# Patient Record
Sex: FEMALE | Race: ASIAN | Employment: PART TIME | URBAN - METROPOLITAN AREA
[De-identification: names, ages, dates, MRNs, and addresses within clinical notes are randomized per-mention and may not be internally consistent; named-entity substitution may affect disease eponyms.]

---

## 2020-10-12 ENCOUNTER — TRANSCRIBE ORDER (OUTPATIENT)
Dept: SCHEDULING | Age: 45
End: 2020-10-12

## 2020-10-12 DIAGNOSIS — Z12.31 VISIT FOR SCREENING MAMMOGRAM: Primary | ICD-10-CM

## 2020-10-27 ENCOUNTER — HOSPITAL ENCOUNTER (OUTPATIENT)
Dept: MAMMOGRAPHY | Age: 45
Discharge: HOME | End: 2020-10-27
Attending: INTERNAL MEDICINE
Payer: COMMERCIAL

## 2020-10-27 DIAGNOSIS — Z12.31 VISIT FOR SCREENING MAMMOGRAM: ICD-10-CM

## 2020-10-27 PROCEDURE — 77067 SCR MAMMO BI INCL CAD: CPT

## 2020-10-27 PROCEDURE — 77063 BREAST TOMOSYNTHESIS BI: CPT

## 2021-06-15 ENCOUNTER — OFFICE VISIT (OUTPATIENT)
Dept: FAMILY MEDICINE CLINIC | Age: 46
End: 2021-06-15
Payer: COMMERCIAL

## 2021-06-15 VITALS
HEART RATE: 55 BPM | HEIGHT: 65 IN | DIASTOLIC BLOOD PRESSURE: 69 MMHG | BODY MASS INDEX: 21.39 KG/M2 | TEMPERATURE: 97.2 F | OXYGEN SATURATION: 100 % | RESPIRATION RATE: 16 BRPM | WEIGHT: 128.4 LBS | SYSTOLIC BLOOD PRESSURE: 114 MMHG

## 2021-06-15 DIAGNOSIS — Z13.29 SCREENING FOR THYROID DISORDER: ICD-10-CM

## 2021-06-15 DIAGNOSIS — Z12.31 ENCOUNTER FOR SCREENING MAMMOGRAM FOR MALIGNANT NEOPLASM OF BREAST: ICD-10-CM

## 2021-06-15 DIAGNOSIS — Z13.220 SCREENING FOR HYPERLIPIDEMIA: ICD-10-CM

## 2021-06-15 DIAGNOSIS — Z00.00 PREVENTATIVE HEALTH CARE: ICD-10-CM

## 2021-06-15 DIAGNOSIS — M79.669 PAIN IN SHIN, UNSPECIFIED LATERALITY: ICD-10-CM

## 2021-06-15 DIAGNOSIS — Z13.1 SCREENING FOR DIABETES MELLITUS: ICD-10-CM

## 2021-06-15 DIAGNOSIS — Z00.00 ANNUAL PHYSICAL EXAM: Primary | ICD-10-CM

## 2021-06-15 PROCEDURE — 99386 PREV VISIT NEW AGE 40-64: CPT | Performed by: NURSE PRACTITIONER

## 2021-06-15 NOTE — PROGRESS NOTES
Melany Landau presents today for   Chief Complaint   Patient presents with    New Patient       Melany Landau preferred language for health care discussion is english/other. Personal Protective Equipment:   Personal Protective Equipment was used including: mask-surgical and hands-gloves. Patient was placed on no precaution(s). Patient was masked. Precautions:   Patient currently on None  Patient currently roomed with door closed    Is someone accompanying this pt? No    Is the patient using any DME equipment during OV? No    Depression Screening:  3 most recent PHQ Screens 4/6/2016   Little interest or pleasure in doing things Not at all   Feeling down, depressed, irritable, or hopeless Not at all   Total Score PHQ 2 0       Learning Assessment:  Learning Assessment 5/24/2016   PRIMARY LEARNER Patient   HIGHEST LEVEL OF EDUCATION - PRIMARY LEARNER  GRADUATED HIGH SCHOOL OR GED   BARRIERS PRIMARY LEARNER Angelicajovon Elizabeths CAREGIVER Yes   CO-LEARNER NAME Ms. Cicero Lesches LEVEL OF EDUCATION GRADUATED HIGH SCHOOL OR GED   PRIMARY LANGUAGE CHINESE (MANDARIN)   PRIMARY LANGUAGE CO-LEARNER ENGLISH    NEED Yes   LEARNER PREFERENCE PRIMARY LISTENING     DEMONSTRATION   LEARNER PREFERENCE CO-LEARNER LISTENING   ANSWERED BY patient   RELATIONSHIP SELF       Abuse Screening:  Abuse Screening Questionnaire 4/6/2016   Do you ever feel afraid of your partner? N   Are you in a relationship with someone who physically or mentally threatens you? N   Is it safe for you to go home? Y       Fall Risk  No flowsheet data found. Pt currently taking Anticoagulant therapy? No    Coordination of Care:  1. Have you been to the ER, urgent care clinic since your last visit? Hospitalized since your last visit? No    2. Have you seen or consulted any other health care providers outside of the [address] [place] [address] since your last visit? Include any pap smears or colon screening.  No

## 2021-06-15 NOTE — PROGRESS NOTES
HISTORY OF PRESENT ILLNESS Romario Bella is a 54 y.o. female seen for physical exam and labs HPI New to clinic. Patient takes no medication. Went to GYN a few months ago for period cramps. But she'd like to have her pap here because it closer. Good appetite, no urinary issues and reg LMP , every 2 months ROS Visit Vitals /69 (BP 1 Location: Left upper arm, BP Patient Position: Sitting, BP Cuff Size: Adult) Pulse (!) 55 Temp 97.2 °F (36.2 °C) (Temporal) Resp 16 Ht 5' 5\" (1.651 m) Wt 128 lb 6.4 oz (58.2 kg) SpO2 100% BMI 21.37 kg/m² Physical Exam 
Past Medical History:  
Diagnosis Date  GERD (gastroesophageal reflux disease)  No known problems There is no problem list on file for this patient. Current Outpatient Medications on File Prior to Visit Medication Sig Dispense Refill  ibuprofen (MOTRIN) 800 mg tablet Take 1 Tab by mouth every eight (8) hours as needed for Pain. 30 Tab 0 No current facility-administered medications on file prior to visit. ASSESSMENT and PLAN 
  ICD-10-CM ICD-9-CM 1. Annual physical exam  Z00.00 V70.0 2. Screening for diabetes mellitus  Z13.1 V77.1 HEMOGLOBIN A1C WITH EAG 3. Screening for hyperlipidemia  Z13.220 V77.91 LIPID PANEL 4. Preventative health care  Z00.00 V70.0 CBC WITH AUTOMATED DIFF  
   METABOLIC PANEL, COMPREHENSIVE URINALYSIS W/ RFLX MICROSCOPIC  
   VITAMIN D, 25 HYDROXY 5. Screening for thyroid disorder  Z13.29 V77.0 T4, FREE  
   TSH 3RD GENERATION 6. Encounter for screening mammogram for malignant neoplasm of breast  Z12.31 V76.12 [facility] 3D GOLDIE W MAMMO BI SCREENING INCL CAD 50% of 30 minutes spent on counseling and managing care.

## 2021-06-15 NOTE — PATIENT INSTRUCTIONS
Mammogram: About This Test  What is it? A mammogram is an X-ray of the breast that is used to screen for breast cancer. This test can find tumors that are too small for you or your doctor to feel. Cancer is most easily treated when it is found at an early stage. Why is this test done? A mammogram is done to:  · Look for breast cancer in women who don't have symptoms. · Find breast cancer in women who have symptoms. Symptoms of breast cancer may include a lump or thickening in the breast, nipple discharge, or dimpling of the skin on one area of the breast.  · Find an area of suspicious breast tissue to remove for an exam under a microscope (biopsy). How do you prepare for the test?  If you've had a mammogram before at another clinic, have the results sent or bring them with you to your appointment. On the day of the mammogram, don't use any deodorant. And don't use perfume, powders, or ointments near or on your breasts. The residue left on your skin by these substances may interfere with the X-rays. How is the test done? · You will need to take off any jewelry that might interfere with the X-ray pictures. · You will need to take off your clothes above the waist.  · You will be given a cloth or paper gown to use during the test.  · You probably will stand during the mammogram.  · One at a time, your breasts will be placed on a flat plate. · Another plate is then pressed firmly against your breast to help flatten out the breast tissue. You may be asked to lift your arm. · For a few seconds while the X-ray picture is being taken, you will need to hold your breath. · At least two pictures are taken of each breast. One is taken from the top and one from the side. How does having a mammogram feel? A mammogram is often uncomfortable but rarely painful.  If you have sensitive or fragile skin or a skin condition, let the technician know before you have your exam. If you have menstrual periods, the procedure is more comfortable when done within 2 weeks after your period has ended. Having your breasts flattened is usually uncomfortable, but it helps the technician get the best images. How long does the test take? · The test will take about 10 to 15 minutes. You may be in the clinic for up to an hour. · You may be asked to wait a few minutes while the images are checked to make sure they don't need to be redone. What happens after the test?  · You will probably be able to go home right away. · You can go back to your usual activities right away. Follow-up care is a key part of your treatment and safety. Be sure to make and go to all appointments, and call your doctor if you are having problems. It's also a good idea to keep a list of the medicines you take. Ask your doctor when you can expect to have your test results. Where can you learn more? Go to http://www.laughlin.com/  Enter Z238 in the search box to learn more about \"Mammogram: About This Test.\"  Current as of: December 17, 2020               Content Version: 12.8  © 6448-3718 [employer]. Care instructions adapted under license by [employer] (which disclaims liability or warranty for this information). If you have questions about a medical condition or this instruction, always ask your healthcare professional. [address] any warranty or liability for your use of this information. Learning About Cervical Cancer Screening  What is a cervical cancer screening test?     The cervix is the lower part of the uterus that opens into the vagina. Cervical cancer screening tests check the cells on the cervix for changes that could lead to cancer. Two tests can be used to screen for cervical cancer. They may be used alone or together. A Pap test.   This test looks for changes in the cells of the cervix. Some of these cell changes could lead to cancer.   A human papillomavirus (HPV) test.   This test looks for the HPV virus. Some high-risk types of HPV can cause cell changes that could lead to cervical cancer. When should you have a screening test?  If you have a cervix, you may need cervical cancer screening. Screening will depend on many things. Ages 24 to 34  Screening options for these ages include:  · A Pap test. If your results are normal, you can wait 3 years to have another test.  · An HPV test beginning at age 22. If your results are negative, you can wait 5 years to have another test.  Ages 27 to 59  Screening options for these ages include:  · A Pap test. If your results are normal, you can wait 3 years to have another test.  · An HPV test. If your results are negative, you can wait 5 years to have another test.  · A Pap test and an HPV test. If your results are normal, you can wait 5 years to be tested again. Ages 72 and older  If you are age 72 or older and you've always had normal screening results, you may not need screening. Talk to your doctor. What do the results of cervical cancer screening mean? Your test results may be normal. Or the results may show minor or serious changes to the cells on your cervix. Minor changes may go away on their own, especially if you are younger than 30. You may have an abnormal test because you have an infection of the vagina or cervix or because you have low estrogen levels after menopause that are causing the cells to change. If you have a high-risk type of human papillomavirus (HPV) or cell changes that could turn into cancer, you may need more tests. Your doctor may suggest that you wait to be retested. Or you may need to have a colposcopy or treatment right away. Your doctor will recommend a follow-up plan based on your results and your age. Follow-up care is a key part of your treatment and safety. Be sure to make and go to all appointments, and call your doctor if you are having problems.  It's also a good idea to know your test results and keep a list of the medicines you take. Where can you learn more? Go to http://www.Melodigram.com/  Enter P919 in the search box to learn more about \"Learning About Cervical Cancer Screening. \"  Current as of: December 17, 2020               Content Version: 12.8  © 4615-2510 [employer]. Care instructions adapted under license by [employer] (which disclaims liability or warranty for this information). If you have questions about a medical condition or this instruction, always ask your healthcare professional. [address] any warranty or liability for your use of this information.

## 2021-06-16 LAB
25(OH)D3+25(OH)D2 SERPL-MCNC: 24.4 NG/ML (ref 30–100)
ALBUMIN SERPL-MCNC: 4.9 G/DL (ref 3.8–4.8)
ALBUMIN/GLOB SERPL: 1.8 {RATIO} (ref 1.2–2.2)
ALP SERPL-CCNC: 65 IU/L (ref 48–121)
ALT SERPL-CCNC: 11 IU/L (ref 0–32)
APPEARANCE UR: ABNORMAL
AST SERPL-CCNC: 17 IU/L (ref 0–40)
BACTERIA #/AREA URNS HPF: ABNORMAL /[HPF]
BASOPHILS # BLD AUTO: 0 X10E3/UL (ref 0–0.2)
BASOPHILS NFR BLD AUTO: 1 %
BILIRUB SERPL-MCNC: 0.4 MG/DL (ref 0–1.2)
BILIRUB UR QL STRIP: NEGATIVE
BUN SERPL-MCNC: 13 MG/DL (ref 6–24)
BUN/CREAT SERPL: 19 (ref 9–23)
CALCIUM SERPL-MCNC: 9.1 MG/DL (ref 8.7–10.2)
CASTS URNS QL MICRO: ABNORMAL /LPF
CHLORIDE SERPL-SCNC: 104 MMOL/L (ref 96–106)
CHOLEST SERPL-MCNC: 236 MG/DL (ref 100–199)
CO2 SERPL-SCNC: 22 MMOL/L (ref 20–29)
COLOR UR: YELLOW
CREAT SERPL-MCNC: 0.7 MG/DL (ref 0.57–1)
EOSINOPHIL # BLD AUTO: 0.1 X10E3/UL (ref 0–0.4)
EOSINOPHIL NFR BLD AUTO: 2 %
EPI CELLS #/AREA URNS HPF: >10 /HPF (ref 0–10)
ERYTHROCYTE [DISTWIDTH] IN BLOOD BY AUTOMATED COUNT: 13.1 % (ref 11.7–15.4)
EST. AVERAGE GLUCOSE BLD GHB EST-MCNC: 108 MG/DL
GLOBULIN SER CALC-MCNC: 2.7 G/DL (ref 1.5–4.5)
GLUCOSE SERPL-MCNC: 92 MG/DL (ref 65–99)
GLUCOSE UR QL: NEGATIVE
HBA1C MFR BLD: 5.4 % (ref 4.8–5.6)
HCT VFR BLD AUTO: 41.1 % (ref 34–46.6)
HDLC SERPL-MCNC: 79 MG/DL
HGB BLD-MCNC: 13.7 G/DL (ref 11.1–15.9)
HGB UR QL STRIP: NEGATIVE
IMM GRANULOCYTES # BLD AUTO: 0 X10E3/UL (ref 0–0.1)
IMM GRANULOCYTES NFR BLD AUTO: 0 %
IMP & REVIEW OF LAB RESULTS: NORMAL
KETONES UR QL STRIP: NEGATIVE
LDLC SERPL CALC-MCNC: 144 MG/DL (ref 0–99)
LEUKOCYTE ESTERASE UR QL STRIP: ABNORMAL
LYMPHOCYTES # BLD AUTO: 1.2 X10E3/UL (ref 0.7–3.1)
LYMPHOCYTES NFR BLD AUTO: 32 %
MCH RBC QN AUTO: 29.7 PG (ref 26.6–33)
MCHC RBC AUTO-ENTMCNC: 33.3 G/DL (ref 31.5–35.7)
MCV RBC AUTO: 89 FL (ref 79–97)
MICRO URNS: ABNORMAL
MONOCYTES # BLD AUTO: 0.4 X10E3/UL (ref 0.1–0.9)
MONOCYTES NFR BLD AUTO: 10 %
NEUTROPHILS # BLD AUTO: 2.1 X10E3/UL (ref 1.4–7)
NEUTROPHILS NFR BLD AUTO: 55 %
NITRITE UR QL STRIP: NEGATIVE
PH UR STRIP: 5 [PH] (ref 5–7.5)
PLATELET # BLD AUTO: 225 X10E3/UL (ref 150–450)
POTASSIUM SERPL-SCNC: 4.6 MMOL/L (ref 3.5–5.2)
PROT SERPL-MCNC: 7.6 G/DL (ref 6–8.5)
PROT UR QL STRIP: ABNORMAL
RBC # BLD AUTO: 4.62 X10E6/UL (ref 3.77–5.28)
RBC #/AREA URNS HPF: ABNORMAL /HPF (ref 0–2)
SODIUM SERPL-SCNC: 140 MMOL/L (ref 134–144)
SP GR UR: 1.03 (ref 1–1.03)
T4 FREE SERPL-MCNC: 1.25 NG/DL (ref 0.82–1.77)
TRIGL SERPL-MCNC: 76 MG/DL (ref 0–149)
TSH SERPL DL<=0.005 MIU/L-ACNC: 2.38 UIU/ML (ref 0.45–4.5)
UROBILINOGEN UR STRIP-MCNC: 0.2 MG/DL (ref 0.2–1)
VLDLC SERPL CALC-MCNC: 13 MG/DL (ref 5–40)
WBC # BLD AUTO: 3.7 X10E3/UL (ref 3.4–10.8)
WBC #/AREA URNS HPF: ABNORMAL /HPF (ref 0–5)

## 2021-06-20 NOTE — PROGRESS NOTES
Patient CBC was normal. CMP unremarkable. Patient's urine was cloudy with leukocyte esterase and 3-10 red blood cells and a few bacteria. Patient had no complaints of UTI. Will confirm with patient. Total cholesterol high at 236, triglycerides 76,  and HDL heart protective at 79. Patient's HDL is very good at 79 and cancels out some of the high cholesterol and LDL. However advised patient to make an effort to eat less animal fat and processed food and eat more fruits and vegetables and soluble fiber type foods. Hemoglobin A1c 5.4, this is good patient is not prediabetic. Patient's A1c 5 years ago was 6%. Good job. TSH and T4 normal. Vitamin D low at 24.4. I will order patient vitamin D2 50,000 units weekly for 8 weeks.

## 2021-06-20 NOTE — PROGRESS NOTES
OFFICE NOTE    Marylee Barefoot is a 54 y.o. female presenting today for office visit for physical exam and labs. 6/15/2021  9:15 AM      Chief Complaint   Patient presents with    New Patient     HPI:   Patient is new to clinic. Patient speaks Mandarin [place] and some [place]. Her 16year-old son is accompanied her to visit to help translate. Patient says she is well and she looks well. Patient and  own a [address] at [email or web address]. She says her  does most of the cooking and she works at front counter and prepares food. Patient reports that she runs daily. At times she has some shin pain bilaterally. Patient says she has not bought new running shoes in 2 years. Advised patient to stretch her legs before running and purchase new running shoes. Patient reports a few months ago she went to a GYN provider with \"period cramps. \" Patient reports her periods are not as regular as they used to be and she gets them about every 2 months. Patient reports she is not pregnant and she is on no birth control. She says they advised patient to use Motrin for cramps. Patient advised that she would like to have her Pap smears done here at our clinic because it is closer. Patient does not smoke cigarettes, use alcohol or illicit drugs. She is sexually active with her . Patient reports appetite is good, no urinary issues, sleeps well and regular bowel movements. Patient denies fever, chills, chest pain, shortness of breath, abdomen pain or dark tarry stool. Patient is due for breast cancer screening, ordered mammogram.  Advised patient she will be due for colonoscopy or or fecal immunoassay for occult blood.     ROS    · General: negative for - chills, fever, weight changes or malaise  · HEENT: no sore throat, nasal congestion, vision problems or ear problems  · Respiratory: no cough, shortness of breath, or wheezing  · Cardiovascular: no chest pain, palpitations, or dyspnea on exertion  · Gastrointestinal: no abdominal pain, N/V, change in bowel habits, or black or bloody stools  · Musculoskeletal: Positive for bilateral shin pain; no back pain, joint pain, joint stiffness, muscle pain or muscle weakness  · Neurological: no numbness, tingling, headache or dizziness  · Endo:  No polyuria or polydipsia. · : no hematuria, dysuria, frequency, hesitancy, or nocturia. · Psychological: negative for - anxiety, depression, sleep disturbances, suicidal or homicidal ideations    PHQ Screening   3 most recent PHQ Screens 6/15/2021   Little interest or pleasure in doing things Not at all   Feeling down, depressed, irritable, or hopeless Not at all   Total Score PHQ 2 0     History  Past Medical History:   Diagnosis Date    GERD (gastroesophageal reflux disease)     No known problems        No past surgical history on file. Social History     Socioeconomic History    Marital status:      Spouse name: Not on file    Number of children: Not on file    Years of education: Not on file    Highest education level: Not on file   Occupational History    Not on file   Tobacco Use    Smoking status: Never Smoker    Smokeless tobacco: Never Used   Substance and Sexual Activity    Alcohol use: No     Alcohol/week: 0.0 standard drinks    Drug use: No    Sexual activity: Yes     Partners: Male   Other Topics Concern    Not on file   Social History Narrative    Born in [place], moved to [address] [place] [address] in 2006. She speaks a little English         Works at [employer] 29 Strain:     Difficulty of Paying Living Expenses:    Food Insecurity:     Worried About [address] in the Last Year:     [address] in the Last Year:    Transportation Needs:     Lack of Transportation (Medical):      Lack of Transportation (Non-Medical):    Physical Activity:     Days of Exercise per Week:     Minutes of Exercise per Session:    Stress:  Feeling of Stress :    Social Connections:     Frequency of Communication with Friends and Family:     Frequency of Social Gatherings with Friends and Family:     Attends Bahai Services:     Active Member of Clubs or Organizations:     Attends Club or Organization Meetings:     Marital Status:    Intimate Partner Violence:     Fear of Current or Ex-Partner:     Emotionally Abused:     Physically Abused:     Sexually Abused:        No Known Allergies    Current Outpatient Medications   Medication Sig Dispense Refill    ibuprofen (MOTRIN) 800 mg tablet Take 1 Tab by mouth every eight (8) hours as needed for Pain. 30 Tab 0     Patient Care Team:  Patient Care Team:  Isaiah Hernandez NP as PCP - General (Nurse Practitioner)  Isaiah Hernandez NP as PCP - [facility] Empaneled Provider    LABS 6/15/2021:  Patient CBC was normal. CMP unremarkable. Patient's urine was cloudy with leukocyte esterase and 3-10 red blood cells and a few bacteria. Patient had no complaints of UTI. Will confirm with patient. Total cholesterol high at 236, triglycerides 76,  and HDL heart protective at 79. Patient's HDL is very good at 79 and cancels out some of the high cholesterol and LDL. However advised patient to make an effort to eat less animal fat and processed food and eat more fruits and vegetables and soluble fiber type foods. Hemoglobin A1c 5.4, this is good patient is not prediabetic. Patient's A1c 5 years ago was 6%. Good job. TSH and T4 normal. Vitamin D low at 24.4. I will order patient vitamin D2 50,000 units weekly for 8 weeks. RADIOLOGY:  None new to review    Physical Exam    The patient appears well, she is pleasant, alert, oriented x 3, in no distress. ENT normal.  Neck supple. No adenopathy or thyromegaly. TIKI. Lungs are clear, good air entry, no wheezes, rhonchi or rales. S1 and S2 normal, no murmurs, regular rate and rhythm. No LAD.   Chest wall negative for tenderness, breasts appear symmetrical  Abdomen is soft without tenderness, guarding, mass or organomegaly. /Anorectal normal. No vaginal discharge. Muscleskeletal, no swelling, no deformity no tenderness or injury. Extremities show no edema, normal peripheral pulses. Neurological is normal without focal findings. Skin: no concerning lesions on full body survey. Psych: normal affect. Mood good. Oriented x 3. Judgement and insight intact. Vitals:    06/15/21 0903   BP: 114/69   Pulse: (!) 55   Resp: 16   Temp: 97.2 °F (36.2 °C)   TempSrc: Temporal   SpO2: 100%   Weight: 128 lb 6.4 oz (58.2 kg)   Height: 5' 5\" (1.651 m)   PainSc:   0 - No pain     Assessment and Plan  Diagnoses and all orders for this visit:    1. Annual physical exam    2. Screening for diabetes mellitus  -     HEMOGLOBIN A1C WITH EAG; Future    3. Screening for hyperlipidemia  -     LIPID PANEL; Future    4. Preventative health care  -     CBC WITH AUTOMATED DIFF; Future  -     METABOLIC PANEL, COMPREHENSIVE; Future  -     URINALYSIS W/ RFLX MICROSCOPIC; Future  -     VITAMIN D, 25 HYDROXY; Future    5. Screening for thyroid disorder  -     T4, FREE; Future  -     TSH 3RD GENERATION; Future    6. Encounter for screening mammogram for malignant neoplasm of breast  -     [facility] 3D GOLDIE W MAMMO BI SCREENING INCL CAD; Future    7. Pain in shin, unspecified laterality    Other orders  -     CBC WITH AUTOMATED DIFF  -     METABOLIC PANEL, COMPREHENSIVE  -     URINALYSIS W/ RFLX MICROSCOPIC  -     MICROSCOPIC EXAMINATION  -     LIPID PANEL  -     HEMOGLOBIN A1C WITH EAG  -     TSH 3RD GENERATION  -     VITAMIN D, 25 HYDROXY  -     T4, FREE  -     CVD REPORT    50% of 30 minutes spent on counseling and managing time. Patient will schedule a Pap smear with our office. *Plan of care reviewed with patient. Patient in agreement with plan and expresses understanding.  All questions answered and patient encouraged to call or RTO if further questions or concerns. Follow-up and Dispositions    · Return in about 2 months (around 8/15/2021).    Routing History

## 2021-11-01 ENCOUNTER — HOSPITAL ENCOUNTER (OUTPATIENT)
Dept: WOMENS IMAGING | Age: 46
Discharge: HOME | End: 2021-11-01
Attending: NURSE PRACTITIONER
Payer: COMMERCIAL

## 2021-11-01 DIAGNOSIS — Z12.31 ENCOUNTER FOR SCREENING MAMMOGRAM FOR MALIGNANT NEOPLASM OF BREAST: ICD-10-CM

## 2021-11-01 PROCEDURE — 77063 BREAST TOMOSYNTHESIS BI: CPT

## 2022-05-31 ENCOUNTER — OFFICE VISIT (OUTPATIENT)
Dept: FAMILY MEDICINE CLINIC | Age: 47
End: 2022-05-31
Payer: COMMERCIAL

## 2022-05-31 VITALS
HEART RATE: 54 BPM | RESPIRATION RATE: 16 BRPM | SYSTOLIC BLOOD PRESSURE: 110 MMHG | TEMPERATURE: 98.3 F | HEIGHT: 65 IN | OXYGEN SATURATION: 100 % | BODY MASS INDEX: 20.83 KG/M2 | DIASTOLIC BLOOD PRESSURE: 73 MMHG | WEIGHT: 125 LBS

## 2022-05-31 DIAGNOSIS — Z13.0 SCREENING FOR DEFICIENCY ANEMIA: ICD-10-CM

## 2022-05-31 DIAGNOSIS — Z12.11 SCREEN FOR COLON CANCER: ICD-10-CM

## 2022-05-31 DIAGNOSIS — E78.00 ELEVATED LDL CHOLESTEROL LEVEL: ICD-10-CM

## 2022-05-31 DIAGNOSIS — R73.03 PREDIABETES: ICD-10-CM

## 2022-05-31 DIAGNOSIS — Z12.31 ENCOUNTER FOR SCREENING MAMMOGRAM FOR MALIGNANT NEOPLASM OF BREAST: ICD-10-CM

## 2022-05-31 DIAGNOSIS — Z00.00 PREVENTATIVE HEALTH CARE: ICD-10-CM

## 2022-05-31 DIAGNOSIS — Z76.89 SLEEP CONCERN: ICD-10-CM

## 2022-05-31 DIAGNOSIS — Z00.00 ANNUAL PHYSICAL EXAM: Primary | ICD-10-CM

## 2022-05-31 DIAGNOSIS — Z13.29 SCREENING FOR THYROID DISORDER: ICD-10-CM

## 2022-05-31 PROCEDURE — 99396 PREV VISIT EST AGE 40-64: CPT | Performed by: NURSE PRACTITIONER

## 2022-05-31 RX ORDER — ATORVASTATIN CALCIUM 10 MG/1
10 TABLET, FILM COATED ORAL DAILY
COMMUNITY

## 2022-05-31 NOTE — PROGRESS NOTES
Jessica Das presents today for CPE       Is someone accompanying this pt? Son, Stanford Worthy    Is the patient using any DME equipment during OV? no    1. Have you been to the ER, urgent care clinic since your last visit? Hospitalized since your last visit? no    2. Have you seen or consulted any other health care providers outside of the [address] [place] [address] since your last visit? no     3. For patients aged 39-70: Has the patient had a colonoscopy / FIT/ Cologuard? due      If the patient is female:    4. For patients aged 41-77: Has the patient had a mammogram within the past 2 years? Not due      5.  For patients aged 21-65: Has the patient had a pap smear? due

## 2022-05-31 NOTE — PROGRESS NOTES
OFFICE NOTE    Melecio Noland is a 51 y.o. female presenting today for office visit. 5/31/2022  7:09 AM    Chief Complaint   Patient presents with    Complete Physical       HPI:   In office visit related to physical exam.  Patient says she us well and she appears well. She is here with her  and her son is translating. Per son, patient went to urgent care last Saturday for swallowing problems. It appears she was given amoxi and steroids. She has a sore throat and her \"voice was bad. \" those symptoms has resolved. She says she isn't sleeping well, she has broken sleep. We talked about sleep hygiene and stretching before bedtime. Patients last menstrual period was 5/30/2022     Patient reports appetite is good, no urinary issues, sleeps well and regular bowel movements. Patient denies fever, chills, chest pain, shortness of breath, abdomen pain or dark tarry stool. ROS:    · General: negative for - chills, fever, weight changes or malaise  · HEENT: no sore throat, nasal congestion, vision problems or ear problems  · Respiratory: no cough, shortness of breath, or wheezing  · Cardiovascular: no chest pain, palpitations, or dyspnea on exertion  · Gastrointestinal: no abdominal pain, N/V, change in bowel habits, or black or bloody stools  · Musculoskeletal: no back pain, joint pain, joint stiffness, muscle pain or muscle weakness  · Neurological: no numbness, tingling, headache or dizziness  · Endo:  No polyuria or polydipsia. · : no hematuria, dysuria, frequency, hesitancy, or nocturia.     · Skin: No itching or rash, no open skin, no unusual lesions   · Psychological: negative for - anxiety, depression, sleep disturbances, suicidal or homicidal ideations     PHQ Screening   3 most recent PHQ Screens 5/31/2022   Little interest or pleasure in doing things Not at all   Feeling down, depressed, irritable, or hopeless Not at all   Total Score PHQ 2 0     History  Past Medical History:   Diagnosis Date    GERD (gastroesophageal reflux disease)     No known problems        No past surgical history on file. Social History     Socioeconomic History    Marital status:      Spouse name: Not on file    Number of children: Not on file    Years of education: Not on file    Highest education level: Not on file   Occupational History    Not on file   Tobacco Use    Smoking status: Never Smoker    Smokeless tobacco: Never Used   Substance and Sexual Activity    Alcohol use: No     Alcohol/week: 0.0 standard drinks    Drug use: No    Sexual activity: Yes     Partners: Male   Other Topics Concern    Not on file   Social History Narrative    Born in [place], moved to [facility] in 2006. She speaks a little English         Works at [employer] Strain:     Difficulty of Paying Living Expenses: Not on file   Food Insecurity:     Worried About [address] [employer] in the Last Year: Not on file    [address] [employer] in the Last Year: Not on file   Transportation Needs:     Lack of Transportation (Medical): Not on file    Lack of Transportation (Non-Medical):  Not on file   Physical Activity:     Days of Exercise per Week: Not on file    Minutes of Exercise per Session: Not on file   Stress:     Feeling of Stress : Not on file   Social Connections:     Frequency of Communication with Friends and Family: Not on file    Frequency of Social Gatherings with Friends and Family: Not on file    Attends Islam Services: Not on file    Active Member of Clubs or Organizations: Not on file    Attends Club or Organization Meetings: Not on file    Marital Status: Not on file   Intimate Partner Violence:     Fear of Current or Ex-Partner: Not on file    Emotionally Abused: Not on file    Physically Abused: Not on file    Sexually Abused: Not on file   Housing Stability:     Unable to Pay for Housing in the Last Year: Not on file    Number of Places Lived in the Last Year: Not on file    Unstable Housing in the Last Year: Not on file       No Known Allergies    Current Outpatient Medications   Medication Sig Dispense Refill    atorvastatin (LIPITOR) 10 mg tablet Take 10 mg by mouth daily.  ibuprofen (MOTRIN) 800 mg tablet Take 1 Tab by mouth every eight (8) hours as needed for Pain. 30 Tab 0     Patient Care Team:  Patient Care Team:  William Ayala NP as PCP - General (Nurse Practitioner)  William Ayala NP as PCP - [facility] Provider    LABS 6/20/2021:  Patient CBC was normal. CMP unremarkable. Patient's urine was cloudy with leukocyte esterase and 3-10 red blood cells and a few bacteria. Patient had no complaints of UTI. Will confirm with patient.      Total cholesterol high at 236, triglycerides 76,  and HDL heart protective at 79. Patient's HDL is very good at 79 and cancels out some of the high cholesterol and LDL. However advised patient to make an effort to eat less animal fat and processed food and eat more fruits and vegetables and soluble fiber type foods. Hemoglobin A1c 5.4, this is good patient is not prediabetic. Patient's A1c 5 years ago was 6%. Good job. TSH and T4 normal. Vitamin D low at 24.4. I will order patient vitamin D2 50,000 units weekly for 8 weeks.         Physical Exam  Patient appears well, she is pleasant, alert, oriented x 3, in no distress. ENT normal.  Neck supple. No adenopathy or thyromegaly. TIKI. Lungs are clear, good air entry, no wheezes, rhonchi or rales. Cardiovascular, S1 and S2 normal, no murmurs, regular rate and rhythm. No LAD. Chest wall negative for tenderness  Abdomen is soft without tenderness  /Anorectal, deferred. Muscleskeletal, no swelling, no tenderness, no injury. Extremities show no edema  Neurological is normal without focal findings. Skin: no concerning lesions. Psych: normal affect. Mood good. Oriented x 3. Judgement and insight intact.       Vitals: 05/31/22 0815   BP: 110/73   Pulse: (!) 54   Resp: 16   Temp: 98.3 °F (36.8 °C)   TempSrc: Temporal   SpO2: 100%   Weight: 125 lb (56.7 kg)   Height: 5' 5\" (1.651 m)   PainSc:   0 - No pain   LMP: 05/30/2022       Assessment and Plan    Diagnoses and all orders for this visit:    Annual physical exam  -     LIPID PANEL; Future  -     HEMOGLOBIN A1C WITH EAG; Future  -     CBC WITH AUTOMATED DIFF; Future  -     METABOLIC PANEL, COMPREHENSIVE; Future  -     T4, FREE; Future  -     TSH 3RD GENERATION; Future  -     URINALYSIS W/ RFLX MICROSCOPIC; Future    Prediabetes  -     HEMOGLOBIN A1C WITH EAG; Future    Screening for deficiency anemia  -     CBC WITH AUTOMATED DIFF; Future    Preventative health care  -     T4, FREE; Future  -     URINALYSIS W/ RFLX MICROSCOPIC; Future    Elevated LDL cholesterol level  -     LIPID PANEL; Future    Screening for thyroid disorder  -     TSH 3RD GENERATION; Future    Sleep concern  -     VITAMIN D, 25 HYDROXY; Future    Encounter for screening mammogram for malignant neoplasm of breast  -     [facility] 3D GOLDIE W MAMMO BI SCREENING INCL CAD; Future    Screen for colon cancer  -     [facility] 3D GOLDIE W MAMMO BI SCREENING INCL CAD; Future  -     REFERRAL TO GASTROENTEROLOGY         *Plan of care reviewed with patient. Patient in agreement with plan and expresses understanding. All questions answered and patient encouraged to call or RTO if further questions or concerns. 50% of 39 minutes spent on counseling and managing her care. Follow-up and Dispositions    · Return in about 1 month (around 6/30/2022) for 1-2 months for pap smear and lab review .        JORGE Quintana

## 2022-05-31 NOTE — PATIENT INSTRUCTIONS
Learning About Sleeping Well  What does sleeping well mean? Sleeping well means getting enough sleep to feel good and stay healthy. How much sleep is enough varies among people. The number of hours you sleep and how you feel when you wake up are both important. If you do not feel refreshed, you probably need more sleep. Another sign of not getting enough sleep is feeling tired during the day. Experts recommend that adults get at least 7 or more hours of sleep per day. Children and older adults need more sleep. Why is getting enough sleep important? Getting enough quality sleep is a basic part of good health. When your sleep suffers, your physical health, mood, and your thoughts can suffer too. You may find yourself feeling more grumpy or stressed. Not getting enough sleep also can lead to serious problems, including injury, accidents, anxiety, and depression. What might cause poor sleeping? Many things can cause sleep problems, including:  · Changes to your sleep schedule. · Stress. Stress can be caused by fear about a single event, such as giving a speech. Or you may have ongoing stress, such as worry about work or school. · Depression, anxiety, and other mental or emotional conditions. · Changes in your sleep habits or surroundings. This includes changes that happen where you sleep, such as noise, light, or sleeping in a different bed. It also includes changes in your sleep pattern, such as having jet lag or working a late shift. · Health problems, such as pain, breathing problems, and restless legs syndrome. · Lack of regular exercise. · Using alcohol, nicotine, or caffeine before bed. How can you help yourself? Here are some tips that may help you sleep more soundly and wake up feeling more refreshed. Your sleeping area   · Use your bedroom only for sleeping and sex. A bit of light reading may help you fall asleep. But if it doesn't, do your reading elsewhere in the house.  Try not to use your TV, computer, smartphone, or tablet while you are in bed. · Be sure your bed is big enough to stretch out comfortably, especially if you have a sleep partner. · Keep your bedroom quiet, dark, and cool. Use curtains, blinds, or a sleep mask to block out light. To block out noise, use earplugs, soothing music, or a \"white noise\" machine. Your evening and bedtime routine   · Create a relaxing bedtime routine. You might want to take a warm shower or bath, or listen to soothing music. · Go to bed at the same time every night. And get up at the same time every morning, even if you feel tired. What to avoid   · Limit caffeine (coffee, tea, caffeinated sodas) during the day, and don't have any for at least 6 hours before bedtime. · Avoid drinking alcohol before bedtime. Alcohol can cause you to wake up more often during the night. · Try not to smoke or use tobacco, especially in the evening. Nicotine can keep you awake. · Limit naps during the day, especially close to bedtime. · Avoid lying in bed awake for too long. If you can't fall asleep or if you wake up in the middle of the night and can't get back to sleep within about 20 minutes, get out of bed and go to another room until you feel sleepy. · Avoid taking medicine right before bed that may keep you awake or make you feel hyper or energized. Your doctor can tell you if your medicine may do this and if you can take it earlier in the day. If you can't sleep   · Imagine yourself in a peaceful, pleasant scene. Focus on the details and feelings of being in a place that is relaxing. · Get up and do a quiet or boring activity until you feel sleepy. · Avoid drinking any liquids before going to bed to help prevent waking up often to use the bathroom. Where can you learn more? Go to http://www.gray.com/  Enter I503 in the search box to learn more about \"Learning About Sleeping Well. \"  Current as of: June 16, 2021               Content Version: 13.2  © 8376-6723 Healthwise, Incorporated. Care instructions adapted under license by [employer] (which disclaims liability or warranty for this information). If you have questions about a medical condition or this instruction, always ask your healthcare professional. [address] any warranty or liability for your use of this information.

## 2022-06-01 DIAGNOSIS — N39.0 E. COLI UTI: Primary | ICD-10-CM

## 2022-06-01 DIAGNOSIS — B96.20 E. COLI UTI: Primary | ICD-10-CM

## 2022-06-01 LAB
25(OH)D3+25(OH)D2 SERPL-MCNC: 20.9 NG/ML (ref 30–100)
ALBUMIN SERPL-MCNC: 4.5 G/DL (ref 3.8–4.8)
ALBUMIN/GLOB SERPL: 1.5 {RATIO} (ref 1.2–2.2)
ALP SERPL-CCNC: 76 IU/L (ref 44–121)
ALT SERPL-CCNC: 13 IU/L (ref 0–32)
APPEARANCE UR: ABNORMAL
AST SERPL-CCNC: 23 IU/L (ref 0–40)
BACTERIA #/AREA URNS HPF: ABNORMAL /[HPF]
BASOPHILS # BLD AUTO: 0 X10E3/UL (ref 0–0.2)
BASOPHILS NFR BLD AUTO: 1 %
BILIRUB SERPL-MCNC: 0.5 MG/DL (ref 0–1.2)
BILIRUB UR QL STRIP: NEGATIVE
BUN SERPL-MCNC: 13 MG/DL (ref 6–24)
BUN/CREAT SERPL: 16 (ref 9–23)
CALCIUM SERPL-MCNC: 10 MG/DL (ref 8.7–10.2)
CASTS URNS QL MICRO: ABNORMAL /LPF
CHLORIDE SERPL-SCNC: 102 MMOL/L (ref 96–106)
CHOLEST SERPL-MCNC: 228 MG/DL (ref 100–199)
CO2 SERPL-SCNC: 24 MMOL/L (ref 20–29)
COLOR UR: YELLOW
CREAT SERPL-MCNC: 0.81 MG/DL (ref 0.57–1)
EGFR: 90 ML/MIN/1.73
EOSINOPHIL # BLD AUTO: 0.1 X10E3/UL (ref 0–0.4)
EOSINOPHIL NFR BLD AUTO: 2 %
EPI CELLS #/AREA URNS HPF: ABNORMAL /HPF (ref 0–10)
ERYTHROCYTE [DISTWIDTH] IN BLOOD BY AUTOMATED COUNT: 14 % (ref 11.7–15.4)
EST. AVERAGE GLUCOSE BLD GHB EST-MCNC: 114 MG/DL
GLOBULIN SER CALC-MCNC: 3.1 G/DL (ref 1.5–4.5)
GLUCOSE SERPL-MCNC: 91 MG/DL (ref 65–99)
GLUCOSE UR QL STRIP: NEGATIVE
HBA1C MFR BLD: 5.6 % (ref 4.8–5.6)
HCT VFR BLD AUTO: 42.4 % (ref 34–46.6)
HDLC SERPL-MCNC: 80 MG/DL
HGB BLD-MCNC: 13.8 G/DL (ref 11.1–15.9)
HGB UR QL STRIP: ABNORMAL
IMM GRANULOCYTES # BLD AUTO: 0 X10E3/UL (ref 0–0.1)
IMM GRANULOCYTES NFR BLD AUTO: 0 %
IMP & REVIEW OF LAB RESULTS: NORMAL
KETONES UR QL STRIP: NEGATIVE
LDLC SERPL CALC-MCNC: 134 MG/DL (ref 0–99)
LEUKOCYTE ESTERASE UR QL STRIP: NEGATIVE
LYMPHOCYTES # BLD AUTO: 1.1 X10E3/UL (ref 0.7–3.1)
LYMPHOCYTES NFR BLD AUTO: 31 %
MCH RBC QN AUTO: 29.5 PG (ref 26.6–33)
MCHC RBC AUTO-ENTMCNC: 32.5 G/DL (ref 31.5–35.7)
MCV RBC AUTO: 91 FL (ref 79–97)
MICRO URNS: ABNORMAL
MONOCYTES # BLD AUTO: 0.3 X10E3/UL (ref 0.1–0.9)
MONOCYTES NFR BLD AUTO: 10 %
NEUTROPHILS # BLD AUTO: 1.9 X10E3/UL (ref 1.4–7)
NEUTROPHILS NFR BLD AUTO: 56 %
NITRITE UR QL STRIP: POSITIVE
PH UR STRIP: 6.5 [PH] (ref 5–7.5)
PLATELET # BLD AUTO: 264 X10E3/UL (ref 150–450)
POTASSIUM SERPL-SCNC: 5 MMOL/L (ref 3.5–5.2)
PROT SERPL-MCNC: 7.6 G/DL (ref 6–8.5)
PROT UR QL STRIP: NEGATIVE
RBC # BLD AUTO: 4.68 X10E6/UL (ref 3.77–5.28)
RBC #/AREA URNS HPF: >30 /HPF (ref 0–2)
SODIUM SERPL-SCNC: 139 MMOL/L (ref 134–144)
SP GR UR STRIP: 1.01 (ref 1–1.03)
T4 FREE SERPL-MCNC: 1.27 NG/DL (ref 0.82–1.77)
TRIGL SERPL-MCNC: 80 MG/DL (ref 0–149)
TSH SERPL DL<=0.005 MIU/L-ACNC: 3.25 UIU/ML (ref 0.45–4.5)
UROBILINOGEN UR STRIP-MCNC: 0.2 MG/DL (ref 0.2–1)
VLDLC SERPL CALC-MCNC: 14 MG/DL (ref 5–40)
WBC # BLD AUTO: 3.5 X10E3/UL (ref 3.4–10.8)
WBC #/AREA URNS HPF: ABNORMAL /HPF (ref 0–5)

## 2022-06-01 RX ORDER — CEPHALEXIN 500 MG/1
500 CAPSULE ORAL 4 TIMES DAILY
Qty: 20 CAPSULE | Refills: 0 | Status: SHIPPED | OUTPATIENT
Start: 2022-06-01 | End: 2022-06-06

## 2022-06-01 NOTE — PROGRESS NOTES
CBC within normal limits. Metabolic panel normal.  Your urine was cloudy with a little bit of blood and positive for nitrites. You likely have a UTI and I will order you an antibiotic. Your total cholesterol is high at 228 and your LDL cholesterol is high at 134. Your good cholesterol/HDL is heart protective at 80. Hemoglobin A1c was 5.6. Your vitamin D was low. I will order you high-dose vitamin D weekly for 8 weeks and you will take a daily dose so you do not get low again. TSH and T4 within normal limits. [FreeTextEntry1] : COPD likely moderate; Mostly obesity related restriction \par Exam without bronchospasm, normal pulse oximetry on room air, no acute pulmonary complaints\par Spirometry stable\par Echocardiogram in the past without any significant pulmonary hypertension\par Obesity with obstructive sleep apnea on nocturnal BiPAP 20/16 with with 4 L O2\par Compliant with nocturnal BiPAP\par Appears at baseline\par Compliance with inhaled Breo\par has home neb\par No significant pulmonary or sleep medicine related contraindication to colonoscopy under conscious sedation in the hospital setting\par \par Continue Breo daily\par Prednisone 20 mg 3 days prior to procedures\par DuoNeb prior to induction and q.6 hours postoperatively\par Incentive spirometry and DVT prophylaxis\par Monitored bed, continuous sp02\par Nocturnal BiPAP 20/16 with 4 L 02 q. HS. and postoperatively\par Pulmonary/sleep  followup stressed \par

## 2022-06-02 ENCOUNTER — TELEPHONE (OUTPATIENT)
Dept: FAMILY MEDICINE CLINIC | Age: 47
End: 2022-06-02

## 2022-06-02 RX ORDER — ERGOCALCIFEROL 1.25 MG/1
50000 CAPSULE ORAL
Qty: 8 CAPSULE | Refills: 0 | Status: SHIPPED | OUTPATIENT
Start: 2022-06-02 | End: 2022-07-22

## 2022-06-02 NOTE — TELEPHONE ENCOUNTER
I called patient and spoke with her daughter in regards to her labs. She will  Vit D  and abx from [facility].

## 2022-08-09 ENCOUNTER — OFFICE VISIT (OUTPATIENT)
Dept: FAMILY MEDICINE CLINIC | Age: 47
End: 2022-08-09
Payer: COMMERCIAL

## 2022-08-09 VITALS
WEIGHT: 129.6 LBS | TEMPERATURE: 97.9 F | DIASTOLIC BLOOD PRESSURE: 67 MMHG | BODY MASS INDEX: 21.57 KG/M2 | RESPIRATION RATE: 17 BRPM | SYSTOLIC BLOOD PRESSURE: 95 MMHG | OXYGEN SATURATION: 98 % | HEART RATE: 60 BPM

## 2022-08-09 DIAGNOSIS — L23.7 ALLERGIC DERMATITIS DUE TO POISON IVY: ICD-10-CM

## 2022-08-09 DIAGNOSIS — Z12.4 PAP SMEAR FOR CERVICAL CANCER SCREENING: Primary | ICD-10-CM

## 2022-08-09 DIAGNOSIS — Z00.00 PREVENTATIVE HEALTH CARE: ICD-10-CM

## 2022-08-09 DIAGNOSIS — Z12.31 ENCOUNTER FOR SCREENING MAMMOGRAM FOR MALIGNANT NEOPLASM OF BREAST: ICD-10-CM

## 2022-08-09 PROCEDURE — 99396 PREV VISIT EST AGE 40-64: CPT | Performed by: NURSE PRACTITIONER

## 2022-08-09 RX ORDER — VITAMIN E (DL,TOCOPHERYL ACET) 45 MG/0.25
1 DROPS ORAL 2 TIMES DAILY
Qty: 60 TABLET | Refills: 11 | Status: SHIPPED | OUTPATIENT
Start: 2022-08-09

## 2022-08-09 RX ORDER — PRAMOXINE HYDROCHLORIDE AND ZINC ACETATE LOTION 10; 1 MG/ML; MG/ML
LOTION TOPICAL
Qty: 177 ML | Refills: 0 | Status: SHIPPED | OUTPATIENT
Start: 2022-08-09

## 2022-08-09 NOTE — PROGRESS NOTES
Isaac Fitzpatrick is a 49 y.o. female  established patient, here for evaluation of the following chief complaint(s):  Chief Complaint   Patient presents with    Follow-up     2 month follow up    Gyn Exam    Insect Bite     Both arms and legs x2weeks, itchy. Assessment and Plan  1. Pap smear for cervical cancer screening  -     PAP IG, CT-NG-TV, APTIMA HPV AND RFX 11/99,89([id number]); Future  2. Allergic dermatitis due to poison ivy  -     pramoxine-zinc acetate (Calamine Clear) 1-0.1 % lotion; Apply  to affected area three (3) times daily as needed for Itching. Apply to affected areas 3 times daily, Normal, Disp-177 mL, R-0  3. Encounter for screening mammogram for malignant neoplasm of breast  -     [facility] 3D GOLDIE W MAMMO BI SCREENING INCL CAD; Future  4. Preventative health care  -     calcium combo no.2-vitamin D3 (Citracal-D3 Slow Release) 600 mg-12.5 mcg (500 unit) TbER; Take 1 Tablet by mouth two (2) times a day. For osteoporosis/hypocalcemia, Normal, Disp-60 Tablet, R-11         HPI:   In office visit for women's health exam. She was doing yard work and likely got into poison ivy. She took a showwer afterwards. She has had some itching and lesions. ROS:    General: negative for - chills, fever, weight changes or malaise  HEENT: no sore throat, nasal congestion, vision problems or ear problems  Respiratory: no cough, shortness of breath, or wheezing  Cardiovascular: no chest pain, palpitations, or dyspnea on exertion  Gastrointestinal: no abdominal pain, N/V, change in bowel habits  Musculoskeletal: no back pain or joint pain  Neurological: no headache or dizziness  Endo:  No polyuria or polydipsia  : no urinary  Skin: itchy red places on ankles and forearms   Psychological: negative for - anxiety, depression, sleeps issues    Prior to Admission medications    Medication Sig Start Date End Date Taking?  Authorizing Provider   pramoxine-zinc acetate (Calamine Clear) 1-0.1 % lotion Apply  to affected area three (3) times daily as needed for Itching. Apply to affected areas 3 times daily 8/9/22  Yes Jorge Alberto Saldana NP   calcium combo no.2-vitamin D3 (Citracal-D3 Slow Release) 600 mg-12.5 mcg (500 unit) TbER Take 1 Tablet by mouth two (2) times a day. For osteoporosis/hypocalcemia 8/9/22  Yes Jorge Alberto Saldana NP   ibuprofen (MOTRIN) 800 mg tablet Take 1 Tab by mouth every eight (8) hours as needed for Pain. 7/13/16  Yes Holley Licona MD   atorvastatin (LIPITOR) 10 mg tablet Take 10 mg by mouth daily. Patient not taking: Reported on 8/9/2022    Provider, Historical        Results for orders placed or performed in visit on 08/09/22   CHLAM/GC/TRICH NUCL AMP THIN PREP   Result Value Ref Range    CHLAMYDIA TRACHOMATIS THINPREP Negative Negative    NEISSERIA GONORRHOEAE THINPREP Negative Negative    TRICHOMONAS NUC AMP-THIN PREP Negative Negative   HPV HIGH RISK, THIN PREP   Result Value Ref Range    HPV DNA Probe, High Risk Negative Negative   PAP IG (IMAGE GUIDED)   Result Value Ref Range    PAP IMAGE GUIDED          Physical Exam  Patient appears well, she is pleasant, alert, oriented x 3, in no distress. ENT normal.  Neck supple. No adenopathy or thyromegaly. TIKI. Lungs are clear, good air entry, no wheezes  Cardiovascular, S1 and S2 normal, no murmurs, regular rate and rhythm. Chest wall negative for tenderness  Abdomen is soft without tenderness, guarding  Genitourinary:  Exam conducted with a chaperone present. Hernia: There is no hernia in the left inguinal area or right inguinal area. General: Normal vulva. Exam position: Lithotomy position. Pubic Area: No rash or pubic lice. Labia:         Right: No rash, tenderness, lesion or injury. Left: No rash, tenderness, lesion or injury. Urethra: No prolapse, urethral pain, urethral swelling or urethral lesion. Vagina: Normal. No signs of injury and foreign body.  No vaginal discharge, erythema, tenderness, bleeding, lesions or prolapsed vaginal walls. Cervix: Normal. No eversion. Uterus: Normal. Not deviated, not enlarged, not fixed, not tender and no uterine prolapse. Adnexa: Right adnexa normal and left adnexa normal.        Right: No mass, tenderness or fullness. Left: No mass, tenderness or fullness. Rectum: Normal. No mass, tenderness, anal fissure, external hemorrhoid or internal hemorrhoid. Normal anal tone. Lymphadenopathy:      Lower Body: No right inguinal adenopathy. No left inguinal adenopathy. Muscleskeletal, no swelling, no tenderness, no injury. Extremities show no edema  Neurological is normal without focal findings. Skin: skin lesions appears to poison ivy dermatitis   Psych: normal affect. Mood good. Oriented x 3. Vitals:    08/09/22 0931   BP: 95/67   Pulse: 60   Resp: 17   Temp: 97.9 °F (36.6 °C)   TempSrc: Temporal   SpO2: 98%   Weight: 129 lb 9.6 oz (58.8 kg)   Height: (P) 5' 5\" (1.651 m)   PainSc:   0 - No pain     Advised patient to use Benadryl clear and/or calamine lotion for poison ivy dermatitis. *Plan of care reviewed with patient. Patient in agreement with plan and expresses understanding. All questions answered and patient encouraged to call or RTO if further questions or concerns. On this date 08/09/2022 I have spent 39 minutes reviewing previous notes, test results and face to face with the patient discussing the diagnosis and importance of compliance with the treatment plan as well as documenting on the day of the visit.       JORGE Cedeño

## 2022-08-09 NOTE — PROGRESS NOTES
Christine Diaz is a 51 y.o. presents today for   Chief Complaint   Patient presents with    Follow-up     2 month follow up    Gyn Exam     Is someone accompanying this pt? Yes, daughter. Is the patient using any DME equipment during OV? No    Visit Vitals  Ht (P) 5' 5\" (1.651 m)   BMI (P) 20.80 kg/m²       Depression Screening:   3 most recent PHQ Screens 8/9/2022   Little interest or pleasure in doing things Not at all   Feeling down, depressed, irritable, or hopeless Not at all   Total Score PHQ 2 0       Health Maintenance: reviewed and discussed and ordered per Provider. Health Maintenance Due   Topic Date Due    Hepatitis C Screening  Never done    COVID-19 Vaccine (1) Never done    DTaP/Tdap/Td series (1 - Tdap) Never done    Colorectal Cancer Screening Combo  Never done         Coordination of Care:   1. \"Have you been to the ER, urgent care clinic since your last visit? Hospitalized since your last visit? \" No    2. \"Have you seen or consulted any other health care providers outside of the [address] [place] [address] since your last visit? \" No     3. For patients aged 39-70: Has the patient had a colonoscopy / FIT/ Cologuard? No    If the patient is female:    4. For patients aged 41-77: Has the patient had a mammogram within the past 2 years? Yes - Care Gap present. Most recent result on file    5. For patients aged 21-65: Has the patient had a pap smear? Yes - no Care Gap present, patient in office for pap smear. Advanced Directive:  1. Do you have an Advanced Directive? No      2. Would you like information on Advanced Directives?  No    JANEEN Diaz

## 2022-08-11 LAB
CHLAMYDIA TRACHOMATIS THINPREP, 13342: NEGATIVE
HPV HIGH RISK, 507303: NEGATIVE
NEISSERIA GONORRHOEAE THINPREP, 13343: NEGATIVE
PAP IMAGE GUIDED, 8900296: NORMAL
TRICHOMONAS NUC AMP-THIN PREP,13357: NEGATIVE

## 2022-11-08 ENCOUNTER — HOSPITAL ENCOUNTER (OUTPATIENT)
Dept: WOMENS IMAGING | Age: 47
Discharge: HOME | End: 2022-11-08
Attending: NURSE PRACTITIONER
Payer: COMMERCIAL

## 2022-11-08 DIAGNOSIS — Z12.31 ENCOUNTER FOR SCREENING MAMMOGRAM FOR MALIGNANT NEOPLASM OF BREAST: ICD-10-CM

## 2022-11-08 PROCEDURE — 77067 SCR MAMMO BI INCL CAD: CPT

## 2023-02-14 ENCOUNTER — OFFICE VISIT (OUTPATIENT)
Facility: CLINIC | Age: 48
End: 2023-02-14
Payer: COMMERCIAL

## 2023-02-14 VITALS
OXYGEN SATURATION: 99 % | SYSTOLIC BLOOD PRESSURE: 120 MMHG | HEIGHT: 65 IN | BODY MASS INDEX: 22.53 KG/M2 | DIASTOLIC BLOOD PRESSURE: 79 MMHG | TEMPERATURE: 98 F | WEIGHT: 135.2 LBS | HEART RATE: 67 BPM | RESPIRATION RATE: 16 BRPM

## 2023-02-14 DIAGNOSIS — R05.1 ACUTE COUGH: Primary | ICD-10-CM

## 2023-02-14 DIAGNOSIS — R14.0 ABDOMINAL BLOATING: ICD-10-CM

## 2023-02-14 DIAGNOSIS — Z11.59 NEED FOR HEPATITIS C SCREENING TEST: ICD-10-CM

## 2023-02-14 DIAGNOSIS — Z12.11 SCREEN FOR COLON CANCER: ICD-10-CM

## 2023-02-14 DIAGNOSIS — E78.00 ELEVATED LDL CHOLESTEROL LEVEL: ICD-10-CM

## 2023-02-14 DIAGNOSIS — R12 HEART BURN: ICD-10-CM

## 2023-02-14 PROCEDURE — 99214 OFFICE O/P EST MOD 30 MIN: CPT | Performed by: NURSE PRACTITIONER

## 2023-02-14 RX ORDER — FAMOTIDINE 20 MG/1
20 TABLET, FILM COATED ORAL 2 TIMES DAILY
Qty: 60 TABLET | Refills: 3 | Status: SHIPPED | OUTPATIENT
Start: 2023-02-14

## 2023-02-14 ASSESSMENT — PATIENT HEALTH QUESTIONNAIRE - PHQ9
SUM OF ALL RESPONSES TO PHQ QUESTIONS 1-9: 0
SUM OF ALL RESPONSES TO PHQ QUESTIONS 1-9: 0
SUM OF ALL RESPONSES TO PHQ9 QUESTIONS 1 & 2: 0
1. LITTLE INTEREST OR PLEASURE IN DOING THINGS: 0
SUM OF ALL RESPONSES TO PHQ QUESTIONS 1-9: 0
2. FEELING DOWN, DEPRESSED OR HOPELESS: 0
SUM OF ALL RESPONSES TO PHQ QUESTIONS 1-9: 0

## 2023-02-14 NOTE — PROGRESS NOTES
Bala Amado is a 49 y.o. presents today for No chief complaint on file. Is someone accompanying this pt? yes,     Is the patient using any DME equipment during OV? no  There were no vitals filed for this visit. Depression Screening:   No flowsheet data found. Health Maintenance: reviewed and discussed and ordered per Provider. Health Maintenance Due   Topic Date Due    COVID-19 Vaccine (1) Never done    Hepatitis C screen  Never done    DTaP/Tdap/Td vaccine (1 - Tdap) Never done    Colorectal Cancer Screen  Never done    Flu vaccine (1) Never done         Coordination of Care:   1. \"Have you been to the ER, urgent care clinic since your last visit? Hospitalized since your last visit? \" no    2. \"Have you seen or consulted any other health care providers outside of the [address] [place] [address] since your last visit? \" no    3. For patients aged 39-70: Has the patient had a colonoscopy / FIT/ Cologuard? no  If the patient is female:    4. For patients aged 41-77: Has the patient had a mammogram within the past 2 years? Yes     5. For patients aged 21-65: Has the patient had a pap smear? Yes   Advanced Directive:  1. Do you have an Advanced Directive? No     2. Would you like information on Advanced Directives?  No    Christiano Barron, IRMA

## 2023-02-14 NOTE — PROGRESS NOTES
Sarah Chen is a 49 y.o. female  established patient, here for evaluation of the following chief complaint(s):  Chief Complaint   Patient presents with    Abdominal Pain    Follow-up     6 Month F/U       Assessment and Plan  1. Acute cough  2. Elevated LDL cholesterol level  -     Lipid Panel; Future  3. Screen for colon cancer  4. Heart burn  -     famotidine (PEPCID) 20 MG tablet; Take 1 tablet by mouth 2 times daily, Disp-60 tablet, R-3Normal  -     H. Pylori Breath Test; Future  -     External Referral To Gastroenterology  5. Abdominal bloating  -     H. Pylori Breath Test; Future  -     External Referral To Gastroenterology  6. Need for hepatitis C screening test  -     Hepatitis C Antibody; Future     No follow-up provider specified. HPI:   In office visit. Patient appears well and in office with her . [employer]  used in office. Patient says at times she has heart burn after eating. Patient says has she has a cough at night and in the morning. Patient denies she feels shortness of breath, fever or chest pain. She has taken OTC cold medication with good results. Patient says she doesn't feel bad and she thinks likely related to weather switching back from cold to warm. She is not taking atorvastatin and didn't pick it up at the pharmacy. She has requested we test her lipids again.      ROS:    General: negative for - chills, fever, weight changes or malaise  HEENT: no sore throat, nasal congestion, vision problems or ear problems  Respiratory: no cough, shortness of breath, or wheezing  Cardiovascular: no chest pain, palpitations, or dyspnea on exertion  Gastrointestinal: no abdominal pain, N/V, change in bowel habits  Musculoskeletal: no back pain or joint pain  Neurological: no headache or dizziness  Endo:  No polyuria or polydipsia  : no urinary  Skin:   Psychological: negative for - anxiety, depression, sleeps issues    Prior to Admission medications    Medication Sig Start Date End Date Taking? Authorizing Provider   famotidine (PEPCID) 20 MG tablet Take 1 tablet by mouth 2 times daily 2/14/23  Yes JOSE Armstrong - CNP   pramoxine-zinc acetate 1-0.1 % LOTN Apply topically 3 times daily as needed 8/9/22  Yes Ar Automatic Reconciliation   atorvastatin (LIPITOR) 10 MG tablet Take 10 mg by mouth daily  Patient not taking: Reported on 2/14/2023    Ar Automatic Reconciliation   ibuprofen (ADVIL;MOTRIN) 800 MG tablet Take 800 mg by mouth every 8 hours as needed  Patient not taking: Reported on 2/14/2023 7/13/16   Ar Automatic Reconciliation        No results found for this visit on 02/14/23. Physical Exam  Patient appears well, she is pleasant, alert, oriented x 3, in no distress. ENT normal.  Neck supple. No adenopathy or thyromegaly. MONICA. Lungs are clear, good air entry, no wheezes  Cardiovascular, S1 and S2 normal, no murmurs, regular rate and rhythm. Abdomen is soft without tenderness, guarding  /Anorectal, deferred. Muscleskeletal, no swelling  Extremities show no edema  Neurological is normal without focal findings. Skin: no concerning lesions. Psych: normal affect. Mood good. Oriented x 3. Vitals:    02/14/23 0813   BP: 120/79   Pulse: 67   Resp: 16   Temp: 98 °F (36.7 °C)   SpO2: 99%         *Plan of care reviewed with patient. Patient in agreement with plan and expresses understanding. All questions answered and patient encouraged to call or RTO if further questions or concerns.      35 minutes     TASHIA Armstrong

## 2023-07-11 ENCOUNTER — OFFICE VISIT (OUTPATIENT)
Facility: CLINIC | Age: 48
End: 2023-07-11
Payer: COMMERCIAL

## 2023-07-11 VITALS
HEART RATE: 52 BPM | HEIGHT: 65 IN | TEMPERATURE: 97.2 F | BODY MASS INDEX: 21.83 KG/M2 | WEIGHT: 131 LBS | DIASTOLIC BLOOD PRESSURE: 84 MMHG | RESPIRATION RATE: 16 BRPM | SYSTOLIC BLOOD PRESSURE: 129 MMHG | OXYGEN SATURATION: 98 %

## 2023-07-11 DIAGNOSIS — R12 HEART BURN: ICD-10-CM

## 2023-07-11 DIAGNOSIS — Z12.31 ENCOUNTER FOR SCREENING MAMMOGRAM FOR MALIGNANT NEOPLASM OF BREAST: ICD-10-CM

## 2023-07-11 DIAGNOSIS — Z00.00 PREVENTATIVE HEALTH CARE: ICD-10-CM

## 2023-07-11 DIAGNOSIS — Z11.59 NEED FOR HEPATITIS C SCREENING TEST: ICD-10-CM

## 2023-07-11 DIAGNOSIS — Z00.00 ANNUAL PHYSICAL EXAM: Primary | ICD-10-CM

## 2023-07-11 DIAGNOSIS — R07.0 THROAT DISCOMFORT: ICD-10-CM

## 2023-07-11 DIAGNOSIS — E78.00 ELEVATED LDL CHOLESTEROL LEVEL: ICD-10-CM

## 2023-07-11 DIAGNOSIS — Z13.29 SCREENING FOR THYROID DISORDER: ICD-10-CM

## 2023-07-11 DIAGNOSIS — R73.03 PREDIABETES: ICD-10-CM

## 2023-07-11 DIAGNOSIS — R14.0 ABDOMINAL BLOATING: ICD-10-CM

## 2023-07-11 LAB
GROUP A STREP ANTIGEN, POC: NEGATIVE
VALID INTERNAL CONTROL, POC: YES

## 2023-07-11 PROCEDURE — 87880 STREP A ASSAY W/OPTIC: CPT | Performed by: NURSE PRACTITIONER

## 2023-07-11 PROCEDURE — 99214 OFFICE O/P EST MOD 30 MIN: CPT | Performed by: NURSE PRACTITIONER

## 2023-07-11 RX ORDER — PHENOL 1.4 %
1 AEROSOL, SPRAY (ML) MUCOUS MEMBRANE DAILY
Qty: 90 TABLET | Refills: 3 | Status: SHIPPED | OUTPATIENT
Start: 2023-07-11

## 2023-07-11 RX ORDER — PRAVASTATIN SODIUM 20 MG
20 TABLET ORAL DAILY
Qty: 90 TABLET | Refills: 3 | Status: SHIPPED | OUTPATIENT
Start: 2023-07-11 | End: 2023-07-11 | Stop reason: CLARIF

## 2023-07-11 NOTE — PROGRESS NOTES
Govind Medina is a 49 y.o. female  established patient, here for evaluation of the following chief complaint(s):  Chief Complaint   Patient presents with    Annual Exam      Assessment and Plan  1. Annual physical exam  -     calcium carbonate (CALCIUM 600) 600 MG TABS tablet; Take 1 tablet by mouth daily, Disp-90 tablet, R-3Normal  2. Elevated LDL cholesterol level  3. Prediabetes  -     Hemoglobin A1C; Future  4. Screening for thyroid disorder  -     TSH + Free T4 Panel; Future  5. Preventative health care  -     Urinalysis with Microscopic; Future  -     Comprehensive Metabolic Panel; Future  -     CBC with Auto Differential; Future  -     Vitamin D 25 Hydroxy; Future  6. Throat discomfort  -     AMB POC RAPID STREP A  -     Culture, Throat; Future  7. Encounter for screening mammogram for malignant neoplasm of breast  -     RAHUL DIGITAL SCREEN W OR WO CAD BILATERAL; Future     Return in about 3 months (around 10/11/2023) for Routine and lab results, 20. HPI:   In office visit. Pt is well.  used, Mandarin [place]. Pt feels her energy is good, she has regular BMs and her sleep could be better. Pt reports she exercises     Pt reports she has had discomfort of her throat when she eats warm items for the last 3 days.  Denies fever or sick contacts    Pt has a HX of elevated LDL, declined a statin     ROS:    General: negative for - chills, fever, weight changes or malaise  HEENT: + for discomfort throat, no nasal congestion, vision problems or ear problems  Respiratory: no cough, shortness of breath, or wheezing  Cardiovascular: no chest pain, palpitations, or dyspnea on exertion  Gastrointestinal: no abdominal pain, N/V, change in bowel habits  Musculoskeletal: no back pain or joint pain  Neurological: no headache or dizziness  Endo:  No polyuria or polydipsia  : no urinary  Skin: none   Psychological: negative for - anxiety, depression, sleeps issues    Prior to Admission medications

## 2023-07-13 LAB
A/G RATIO: 1.7 RATIO (ref 1.1–2.6)
ALBUMIN SERPL-MCNC: 4.7 G/DL (ref 3.5–5)
ALP BLD-CCNC: 81 U/L (ref 25–115)
ALT SERPL-CCNC: 12 U/L (ref 5–40)
ANION GAP SERPL CALCULATED.3IONS-SCNC: 8 MMOL/L (ref 3–15)
AST SERPL-CCNC: 15 U/L (ref 10–37)
AVERAGE GLUCOSE: 116 MG/DL (ref 91–123)
BACTERIA: PRESENT
BASOPHILS # BLD: 0 % (ref 0–2)
BASOPHILS ABSOLUTE: 0 K/UL (ref 0–0.2)
BILIRUB SERPL-MCNC: 0.4 MG/DL (ref 0.2–1.2)
BILIRUB SERPL-MCNC: NEGATIVE MG/DL
BLOOD: NEGATIVE
BUN BLDV-MCNC: 11 MG/DL (ref 6–22)
CALCIUM SERPL-MCNC: 9.5 MG/DL (ref 8.4–10.5)
CHLORIDE BLD-SCNC: 104 MMOL/L (ref 98–110)
CLARITY: CLEAR
CO2: 27 MMOL/L (ref 20–32)
COLOR: YELLOW
CREAT SERPL-MCNC: 0.7 MG/DL (ref 0.5–1.2)
EOSINOPHIL # BLD: 1 % (ref 0–6)
EOSINOPHILS ABSOLUTE: 0 K/UL (ref 0–0.5)
EPITHELIAL CELLS: ABNORMAL /HPF
GLOBULIN: 2.7 G/DL (ref 2–4)
GLOMERULAR FILTRATION RATE: >60 ML/MIN/1.73 SQ.M.
GLUCOSE: 95 MG/DL (ref 70–99)
GLUCOSE: NEGATIVE MG/DL
HBA1C MFR BLD: 5.7 % (ref 4.8–5.6)
HCT VFR BLD CALC: 41.3 % (ref 35.1–48)
HEMOGLOBIN: 13.2 G/DL (ref 11.7–16)
HYALINE CASTS: ABNORMAL /LPF (ref 0–2)
KETONES, URINE: NEGATIVE MG/DL
LEUKOCYTE ESTERASE, URINE: ABNORMAL
LYMPHOCYTES # BLD: 27 % (ref 20–45)
LYMPHOCYTES ABSOLUTE: 0.9 K/UL (ref 1–4.8)
MCH RBC QN AUTO: 30 PG (ref 26–34)
MCHC RBC AUTO-ENTMCNC: 32 G/DL (ref 31–36)
MCV RBC AUTO: 94 FL (ref 80–99)
MONOCYTES ABSOLUTE: 0.2 K/UL (ref 0.1–1)
MONOCYTES: 6 % (ref 3–12)
NEUTROPHILS ABSOLUTE: 2.1 K/UL (ref 1.8–7.7)
NEUTROPHILS: 66 % (ref 40–75)
NITRITE, URINE: NEGATIVE
PDW BLD-RTO: 13.2 % (ref 10–15.5)
PH, URINE: 7.5 PH (ref 5–8)
PLATELET # BLD: 214 K/UL (ref 140–440)
PMV BLD AUTO: 10.4 FL (ref 9–13)
POTASSIUM SERPL-SCNC: 4 MMOL/L (ref 3.5–5.5)
PROTEIN UA: NEGATIVE MG/DL
RBC URINE: ABNORMAL /HPF
RBC: 4.39 M/UL (ref 3.8–5.2)
SODIUM BLD-SCNC: 139 MMOL/L (ref 133–145)
SPECIFIC GRAVITY: 1.01 (ref 1–1.03)
T4 FREE: 1.3 NG/DL (ref 0.9–1.8)
TOTAL PROTEIN: 7.4 G/DL (ref 6.4–8.3)
TSH SERPL DL<=0.05 MIU/L-ACNC: 3.03 MCU/ML (ref 0.27–4.2)
UROBILINOGEN: 0.2 MG/DL
WBC UA: ABNORMAL /HPF (ref 0–5)
WBC: 3.2 K/UL (ref 4–11)

## 2023-07-14 LAB — RESULT: NORMAL

## 2023-10-10 ENCOUNTER — OFFICE VISIT (OUTPATIENT)
Facility: CLINIC | Age: 48
End: 2023-10-10
Payer: COMMERCIAL

## 2023-10-10 VITALS
HEART RATE: 57 BPM | TEMPERATURE: 97.9 F | BODY MASS INDEX: 22.16 KG/M2 | OXYGEN SATURATION: 99 % | HEIGHT: 65 IN | WEIGHT: 133 LBS | SYSTOLIC BLOOD PRESSURE: 106 MMHG | DIASTOLIC BLOOD PRESSURE: 73 MMHG | RESPIRATION RATE: 16 BRPM

## 2023-10-10 DIAGNOSIS — R07.0 THROAT DISCOMFORT: Primary | ICD-10-CM

## 2023-10-10 DIAGNOSIS — R10.2 PELVIC PAIN IN FEMALE: ICD-10-CM

## 2023-10-10 PROCEDURE — 99214 OFFICE O/P EST MOD 30 MIN: CPT | Performed by: NURSE PRACTITIONER

## 2023-10-10 NOTE — PROGRESS NOTES
Deshaun Bazzi is a 51 y.o. female  established patient, here for evaluation of the following chief complaint(s):  Chief Complaint   Patient presents with    Follow-up    Abdominal Pain     No period for 3 months then spotting last month with abdominal     Other     Welsh/ discomfort in throat       Assessment and Plan  1. Throat discomfort  -     External Referral To ENT  2. Pelvic pain in female  -     US NON OB TRANSVAGINAL; Future     Return in about 1 month (around 11/10/2023) for Pap Smear, 40. HPI:   In office visit. Pt appears well. Virtual  present. Throat discomfort but no pain, feels like she has \"something stuck in her throat. \" She wakes up w/ dry mouth and a lot of bubbles in saliva. Denies she sleeps w/ her mouth open. Pt reports no menstrual periods for 3 months then spotting last month w/ lower right pelvic pain. Denies pain now, UTI symptoms or constipation. Pt has not scheduled her mammogram   ROS:    General: negative for - chills, fever, weight changes or malaise  HEENT: no sore throat, nasal congestion, vision problems or ear problems  Respiratory: no cough, shortness of breath, or wheezing  Cardiovascular: no chest pain, palpitations, or dyspnea on exertion  Gastrointestinal: no abdominal pain, N/V, change in bowel habits  Musculoskeletal: no back pain or joint pain  Neurological: no headache or dizziness  Endo:  No polyuria or polydipsia  : see HPI  Skin: none  Psychological: negative for - anxiety, depression, sleeps issues    Prior to Admission medications    Medication Sig Start Date End Date Taking?  Authorizing Provider   calcium carbonate (CALCIUM 600) 600 MG TABS tablet Take 1 tablet by mouth daily 7/11/23   JOSE Vann CNP   famotidine (PEPCID) 20 MG tablet Take 1 tablet by mouth 2 times daily 2/14/23   JOSE Vann CNP   atorvastatin (LIPITOR) 10 MG tablet Take 10 mg by mouth daily  Patient not taking: Reported on 2/14/2023    Automatic
Directives?  No    Laveda Come, CMA

## 2023-10-20 ENCOUNTER — OFFICE VISIT (OUTPATIENT)
Facility: CLINIC | Age: 48
End: 2023-10-20
Payer: COMMERCIAL

## 2023-10-20 VITALS
OXYGEN SATURATION: 98 % | TEMPERATURE: 97.6 F | HEART RATE: 56 BPM | WEIGHT: 134.6 LBS | RESPIRATION RATE: 18 BRPM | DIASTOLIC BLOOD PRESSURE: 82 MMHG | BODY MASS INDEX: 22.42 KG/M2 | HEIGHT: 65 IN | SYSTOLIC BLOOD PRESSURE: 123 MMHG

## 2023-10-20 DIAGNOSIS — B37.9 YEAST INFECTION: ICD-10-CM

## 2023-10-20 DIAGNOSIS — J02.9 ACUTE PHARYNGITIS, UNSPECIFIED ETIOLOGY: Primary | ICD-10-CM

## 2023-10-20 PROCEDURE — 99214 OFFICE O/P EST MOD 30 MIN: CPT

## 2023-10-20 RX ORDER — FLUCONAZOLE 150 MG/1
150 TABLET ORAL
Qty: 2 TABLET | Refills: 0 | Status: SHIPPED | OUTPATIENT
Start: 2023-10-20

## 2023-10-20 RX ORDER — AMOXICILLIN 875 MG/1
875 TABLET, COATED ORAL 2 TIMES DAILY
Qty: 20 TABLET | Refills: 0 | Status: SHIPPED | OUTPATIENT
Start: 2023-10-20 | End: 2023-10-30

## 2023-10-20 RX ORDER — IBUPROFEN 600 MG/1
600 TABLET ORAL 3 TIMES DAILY PRN
Qty: 30 TABLET | Refills: 0 | Status: SHIPPED | OUTPATIENT
Start: 2023-10-20 | End: 2023-10-30

## 2023-10-20 SDOH — ECONOMIC STABILITY: FOOD INSECURITY: WITHIN THE PAST 12 MONTHS, THE FOOD YOU BOUGHT JUST DIDN'T LAST AND YOU DIDN'T HAVE MONEY TO GET MORE.: NEVER TRUE

## 2023-10-20 SDOH — ECONOMIC STABILITY: HOUSING INSECURITY
IN THE LAST 12 MONTHS, WAS THERE A TIME WHEN YOU DID NOT HAVE A STEADY PLACE TO SLEEP OR SLEPT IN A SHELTER (INCLUDING NOW)?: NO

## 2023-10-20 SDOH — ECONOMIC STABILITY: FOOD INSECURITY: WITHIN THE PAST 12 MONTHS, YOU WORRIED THAT YOUR FOOD WOULD RUN OUT BEFORE YOU GOT MONEY TO BUY MORE.: NEVER TRUE

## 2023-10-20 SDOH — ECONOMIC STABILITY: INCOME INSECURITY: HOW HARD IS IT FOR YOU TO PAY FOR THE VERY BASICS LIKE FOOD, HOUSING, MEDICAL CARE, AND HEATING?: NOT VERY HARD

## 2023-10-29 ASSESSMENT — ENCOUNTER SYMPTOMS
VOMITING: 0
WHEEZING: 0
SHORTNESS OF BREATH: 0
SINUS PRESSURE: 0
EYE ITCHING: 0
EYE REDNESS: 0
FACIAL SWELLING: 0
CHEST TIGHTNESS: 0
RHINORRHEA: 0
DIARRHEA: 0
EYE PAIN: 0
TROUBLE SWALLOWING: 1
NAUSEA: 0
COUGH: 0
VOICE CHANGE: 0
SINUS PAIN: 0
SORE THROAT: 1

## 2023-11-14 ENCOUNTER — OFFICE VISIT (OUTPATIENT)
Facility: CLINIC | Age: 48
End: 2023-11-14
Payer: COMMERCIAL

## 2023-11-14 VITALS
OXYGEN SATURATION: 100 % | RESPIRATION RATE: 16 BRPM | HEART RATE: 53 BPM | DIASTOLIC BLOOD PRESSURE: 84 MMHG | WEIGHT: 134 LBS | BODY MASS INDEX: 22.33 KG/M2 | TEMPERATURE: 98 F | HEIGHT: 65 IN | SYSTOLIC BLOOD PRESSURE: 114 MMHG

## 2023-11-14 DIAGNOSIS — N92.6 ABNORMAL BLEEDING IN MENSTRUAL CYCLE: ICD-10-CM

## 2023-11-14 DIAGNOSIS — Z12.4 PAP SMEAR FOR CERVICAL CANCER SCREENING: Primary | ICD-10-CM

## 2023-11-14 DIAGNOSIS — K30 INDIGESTION: ICD-10-CM

## 2023-11-14 DIAGNOSIS — R10.2 PELVIC PAIN IN FEMALE: ICD-10-CM

## 2023-11-14 DIAGNOSIS — Z78.0 POSTMENOPAUSAL: ICD-10-CM

## 2023-11-14 DIAGNOSIS — Z23 IMMUNIZATION DUE: ICD-10-CM

## 2023-11-14 DIAGNOSIS — R68.81 EARLY SATIETY: ICD-10-CM

## 2023-11-14 PROCEDURE — 90674 CCIIV4 VAC NO PRSV 0.5 ML IM: CPT | Performed by: NURSE PRACTITIONER

## 2023-11-14 PROCEDURE — 90471 IMMUNIZATION ADMIN: CPT | Performed by: NURSE PRACTITIONER

## 2023-11-14 PROCEDURE — 99214 OFFICE O/P EST MOD 30 MIN: CPT | Performed by: NURSE PRACTITIONER

## 2023-11-14 RX ORDER — FAMOTIDINE 20 MG/1
20 TABLET, FILM COATED ORAL 2 TIMES DAILY
Qty: 60 TABLET | Refills: 3 | Status: SHIPPED | OUTPATIENT
Start: 2023-11-14

## 2023-11-14 NOTE — PROGRESS NOTES
Vertie Cogan is a 51 y.o. female  established patient, here for evaluation of the following chief complaint(s):  Chief Complaint   Patient presents with    Follow-up      Assessment and Plan  1. Pap smear for cervical cancer screening  -     PAP IG, Liquid-based, CT-NG [place], HPV (PMACAS)(753962); Future  2. Abnormal bleeding in menstrual cycle  3. Postmenopausal  4. Indigestion  -     famotidine (PEPCID) 20 MG tablet; Take 1 tablet by mouth 2 times daily, Disp-60 tablet, R-3Normal  5. Immunization due  -     Influenza, FLUCELVAX, (age 10 mo+), IM, Preservative Free, 0.5 mL  6. Early satiety  -     External Referral To Gastroenterology       Return in about 3 months (around 2/14/2024), or if symptoms worsen or fail to improve, for GERD and pap results, 20. HPI:   In office visit. Pt speaks Mandarin and we are having issues w/ virtual  service and connecting to the Internet. Pt is requesting a pap smear and asymptomatic. Pt has not had a regular period for 0.5 years but had a light period last month. She is concerned. Pt has indigestion and early satiety   Pt has not made an appt w/ ENT related throat discomfort 10/10/2023  Treated for Acute pharyngitis 10/2023, no throat complaints today, resolved  ROS:    General: negative for - chills, fever, weight changes or malaise  HEENT: no sore throat, nasal congestion, vision problems or ear problems  Respiratory: no cough, shortness of breath, or wheezing  Cardiovascular: no chest pain, palpitations, or dyspnea on exertion  Gastrointestinal: no abdominal pain, N/V, change in bowel habits  Musculoskeletal: no back pain or joint pain  Neurological: no headache or dizziness  Endo:  No polyuria or polydipsia  : no urinary  Skin: none  Psychological: negative for - anxiety, depression, sleeps issues    Prior to Admission medications    Medication Sig Start Date End Date Taking?  Authorizing Provider   famotidine (PEPCID) 20 MG tablet Take 1 tablet by mouth 2

## 2023-12-01 LAB
CHLAMYDIA TRACHOMATIS THINPREP: NEGATIVE
HPV DNA HIGH RISK: NEGATIVE
HPV GENOTYPE: NEGATIVE
HPV, GENOTYPE 18: NEGATIVE
NEISSERIA GONORRHOEAE THINPREP: NEGATIVE
PAP IMAGE GUIDED: NORMAL

## 2024-02-13 ENCOUNTER — OFFICE VISIT (OUTPATIENT)
Facility: CLINIC | Age: 49
End: 2024-02-13
Payer: COMMERCIAL

## 2024-02-13 VITALS
HEART RATE: 64 BPM | RESPIRATION RATE: 16 BRPM | TEMPERATURE: 96.9 F | SYSTOLIC BLOOD PRESSURE: 116 MMHG | HEIGHT: 65 IN | OXYGEN SATURATION: 96 % | DIASTOLIC BLOOD PRESSURE: 81 MMHG | BODY MASS INDEX: 22.66 KG/M2 | WEIGHT: 136 LBS

## 2024-02-13 DIAGNOSIS — E78.00 ELEVATED LDL CHOLESTEROL LEVEL: ICD-10-CM

## 2024-02-13 DIAGNOSIS — R87.9 ABNORMAL VAGINAL FLUIDS: ICD-10-CM

## 2024-02-13 DIAGNOSIS — K30 INDIGESTION: ICD-10-CM

## 2024-02-13 DIAGNOSIS — R73.03 PREDIABETES: Primary | ICD-10-CM

## 2024-02-13 LAB — HBA1C MFR BLD: 5.4 %

## 2024-02-13 PROCEDURE — 83036 HEMOGLOBIN GLYCOSYLATED A1C: CPT | Performed by: NURSE PRACTITIONER

## 2024-02-13 PROCEDURE — 99214 OFFICE O/P EST MOD 30 MIN: CPT | Performed by: NURSE PRACTITIONER

## 2024-02-13 RX ORDER — ATORVASTATIN CALCIUM 10 MG/1
10 TABLET, FILM COATED ORAL DAILY
Qty: 90 TABLET | Refills: 1 | Status: SHIPPED | OUTPATIENT
Start: 2024-02-13 | End: 2024-02-13

## 2024-02-13 RX ORDER — FAMOTIDINE 20 MG/1
20 TABLET, FILM COATED ORAL 2 TIMES DAILY
Qty: 60 TABLET | Refills: 5 | Status: SHIPPED | OUTPATIENT
Start: 2024-02-13 | End: 2024-02-13

## 2024-02-13 RX ORDER — FAMOTIDINE 20 MG/1
20 TABLET, FILM COATED ORAL 2 TIMES DAILY PRN
Qty: 60 TABLET | Refills: 5 | Status: SHIPPED | OUTPATIENT
Start: 2024-02-13

## 2024-02-13 NOTE — PROGRESS NOTES
Diana Morris is a 48 y.o. female  established patient, here for evaluation of the following chief complaint(s):  Chief Complaint   Patient presents with    Gastroesophageal Reflux     Pap results      Assessment and Plan  1. Prediabetes  -     AMB POC HEMOGLOBIN A1C  2. Abnormal vaginal fluids  -     NuSwab VG Plus+Mycoplasmas,AUSTIN; Future  3. Indigestion  -     famotidine (PEPCID) 20 MG tablet; Take 1 tablet by mouth 2 times daily as needed (acid reflux), Disp-60 tablet, R-5Normal  4. Elevated LDL cholesterol level     Return in about 3 months (around 5/13/2024), or if symptoms worsen or fail to improve, for Routine, 20.   HPI:   In office visit.  Patient has not had her H. pylori testing. She has not had anything to eat or drink today. Pt has c/o acid reflux in the past.  She is taking Pepcid with good results.    H/O prediabetes     Declined taking a statin for her high cholesterol     1/2023 normal mammogram, Colonoscopy is current   ROS:    General: negative for - chills, fever, weight changes or malaise  HEENT: no sore throat, nasal congestion, vision problems or ear problems  Respiratory: no cough, shortness of breath, or wheezing  Cardiovascular: no chest pain, palpitations, or dyspnea on exertion  Gastrointestinal: no abdominal pain, N/V, change in bowel habits  Musculoskeletal: no back pain or joint pain  Neurological: no headache or dizziness  Endo:  No polyuria or polydipsia  : no urinary  Skin: none   Psychological: negative for - anxiety, depression, sleeps issues    Prior to Admission medications    Medication Sig Start Date End Date Taking? Authorizing Provider   famotidine (PEPCID) 20 MG tablet Take 1 tablet by mouth 2 times daily as needed (acid reflux) 2/13/24  Yes Patricia Iverson APRN - CNP   fluconazole (DIFLUCAN) 150 MG tablet Take 1 tablet by mouth every 72 hours 10/20/23   Lauren Aguilar, NP-C   ibuprofen (ADVIL;MOTRIN) 600 MG tablet Take 1 tablet by mouth 3 times daily as needed for

## 2024-02-13 NOTE — PROGRESS NOTES
Diana Morris is a 48 y.o. female (: 1975) presenting to address:    Chief Complaint   Patient presents with    Gastroesophageal Reflux     Pap results       Vitals:    24 0853   BP: 116/81   Pulse: 64   Resp: 16   Temp: 96.9 °F (36.1 °C)   SpO2: 96%       Coordination of Care Questionaire:   1. \"Have you been to the ER, urgent care clinic since your last visit?  Hospitalized since your last visit?\" No     2. \"Have you seen or consulted any other health care providers outside of the [facility] since your last visit?\" No      3. For patients aged 45-75: Has the patient had a colonoscopy / FIT/ Cologuard? Yes       If the patient is female:    4. For patients aged 40-74: Has the patient had a mammogram within the past 2 years? Yes       5. For patients aged 21-65: Has the patient had a pap smear? Yes    Advanced Directive:   1. Do you have an Advanced Directive? No     2. Would you like information on Advanced Directives? No

## 2024-02-16 LAB
BACTERIAL VAGINOSIS, NAA: POSITIVE
C TRACH DNA SPEC NAA+PROBE: NEGATIVE
CANDIDA GLABRATA, NAA: NEGATIVE
CANDIDA SPECIES, NAA: NEGATIVE
HELICOBACTER PYLORI, UREA BREATH TEST: NORMAL
MYCOPLASMA GENITALIUM, SWAB: NEGATIVE
NEISSERIA GONORRHOEAE, NAA: NEGATIVE
TRICHOMONAS VAGINALIS BY NAA: NEGATIVE

## 2024-02-20 DIAGNOSIS — N76.0 BACTERIAL VAGINOSIS: Primary | ICD-10-CM

## 2024-02-20 DIAGNOSIS — B96.89 BACTERIAL VAGINOSIS: Primary | ICD-10-CM

## 2024-02-20 RX ORDER — METRONIDAZOLE 500 MG/1
500 TABLET ORAL 2 TIMES DAILY
Qty: 14 TABLET | Refills: 0 | Status: SHIPPED | OUTPATIENT
Start: 2024-02-20 | End: 2024-02-27

## 2024-09-03 ENCOUNTER — OFFICE VISIT (OUTPATIENT)
Facility: CLINIC | Age: 49
End: 2024-09-03
Payer: COMMERCIAL

## 2024-09-03 VITALS
RESPIRATION RATE: 20 BRPM | SYSTOLIC BLOOD PRESSURE: 106 MMHG | BODY MASS INDEX: 22.19 KG/M2 | DIASTOLIC BLOOD PRESSURE: 67 MMHG | HEART RATE: 50 BPM | TEMPERATURE: 97.9 F | HEIGHT: 65 IN | WEIGHT: 133.2 LBS | OXYGEN SATURATION: 97 %

## 2024-09-03 DIAGNOSIS — F51.05 INSOMNIA DUE TO OTHER MENTAL DISORDER: ICD-10-CM

## 2024-09-03 DIAGNOSIS — Z12.31 ENCOUNTER FOR SCREENING MAMMOGRAM FOR MALIGNANT NEOPLASM OF BREAST: ICD-10-CM

## 2024-09-03 DIAGNOSIS — F99 INSOMNIA DUE TO OTHER MENTAL DISORDER: ICD-10-CM

## 2024-09-03 DIAGNOSIS — Z23 IMMUNIZATION DUE: Primary | ICD-10-CM

## 2024-09-03 DIAGNOSIS — F41.9 ANXIETY: ICD-10-CM

## 2024-09-03 PROCEDURE — 99214 OFFICE O/P EST MOD 30 MIN: CPT | Performed by: NURSE PRACTITIONER

## 2024-09-03 PROCEDURE — 90471 IMMUNIZATION ADMIN: CPT | Performed by: NURSE PRACTITIONER

## 2024-09-03 PROCEDURE — 90661 CCIIV3 VAC ABX FR 0.5 ML IM: CPT | Performed by: NURSE PRACTITIONER

## 2024-09-03 RX ORDER — ESCITALOPRAM OXALATE 5 MG/1
5 TABLET ORAL DAILY
Qty: 30 TABLET | Refills: 2 | Status: SHIPPED | OUTPATIENT
Start: 2024-09-03

## 2024-09-03 SDOH — ECONOMIC STABILITY: INCOME INSECURITY: HOW HARD IS IT FOR YOU TO PAY FOR THE VERY BASICS LIKE FOOD, HOUSING, MEDICAL CARE, AND HEATING?: NOT VERY HARD

## 2024-09-03 SDOH — ECONOMIC STABILITY: FOOD INSECURITY: WITHIN THE PAST 12 MONTHS, YOU WORRIED THAT YOUR FOOD WOULD RUN OUT BEFORE YOU GOT MONEY TO BUY MORE.: NEVER TRUE

## 2024-09-03 SDOH — ECONOMIC STABILITY: FOOD INSECURITY: WITHIN THE PAST 12 MONTHS, THE FOOD YOU BOUGHT JUST DIDN'T LAST AND YOU DIDN'T HAVE MONEY TO GET MORE.: NEVER TRUE

## 2024-09-03 ASSESSMENT — PATIENT HEALTH QUESTIONNAIRE - PHQ9
1. LITTLE INTEREST OR PLEASURE IN DOING THINGS: NOT AT ALL
SUM OF ALL RESPONSES TO PHQ QUESTIONS 1-9: 0

## 2024-09-03 NOTE — PROGRESS NOTES
Diana Morris is a 49 y.o. presents today for   Chief Complaint   Patient presents with    3 Month Follow-Up       Is someone accompanying this pt? YES/DAUGHTER     Is the patient using any DME equipment during OV? NO    Depression Screenin/3/2024    10:53 AM 2024     8:58 AM 2023     8:10 AM 2022     9:41 AM 6/15/2021     9:03 AM   PHQ-9 Questionaire   Little interest or pleasure in doing things 0 0 0 0 0   Feeling down, depressed, or hopeless  0 0 0 0   PHQ-9 Total Score 0 0 0 0 0       Abuse Screening:       No data to display                Learning Assessment:  No question data found.    Fall Risk:       No data to display                    Coordination of Care:   1. \"Have you been to the ER, urgent care clinic since your last visit?  Hospitalized since your last visit?\" NO    2. \"Have you seen or consulted any other health care providers outside of the [facility] since your last visit?\" NO    3. For patients aged 45-75: Has the patient had a colonoscopy / FIT/ Cologuard? YES    If the patient is female:    4. For patients aged 40-74: Has the patient had a mammogram within the past 2 years? YES    5. For patients aged 21-65: Has the patient had a pap smear? YES    Health Maintenance: reviewed and discussed and ordered per Provider.    Health Maintenance Due   Topic Date Due    Hepatitis C screen  Never done    Hepatitis B vaccine (1 of 3 - 19+ 3-dose series) Never done    Flu vaccine (1) 2024    Shingles vaccine (1 of 2) Never done    COVID-19 Vaccine ( - - season) Never done        Ysabel Hodges LPN  [facility]  Phone: [phone]  Fax: [phone]

## 2024-09-03 NOTE — PROGRESS NOTES
Diana Morris is a 49 y.o. female  established patient, here for evaluation of the following chief complaint(s):  Chief Complaint   Patient presents with    3 Month Follow-Up      Assessment and Plan  1. Immunization due  -     Influenza, FLUCELVAX Trivalent, (age 6 mo+) IM, Preservative Free, 0.5mL  2. Insomnia due to other mental disorder  3. Anxiety  Comments:  start Lexapro  Orders:  -     escitalopram (LEXAPRO) 5 MG tablet; Take 1 tablet by mouth daily, Disp-30 tablet, R-2Normal  4. Encounter for screening mammogram for malignant neoplasm of breast  -     RAHUL DIGITAL SCREEN W OR WO CAD BILATERAL; Future     Return in about 1 month (around 10/3/2024) for annual exam and labs, 30.   HPI:   In office visit with her daughter who is translating    Pt reports she has poor sleep. Her daughter is here translating. Per daughter she thinks her mother has anxiety.   Patient confirms that she does have anxiety and this may be what is keeping her up at night.  She denies that she has a stressful life.    Colon cancer screening is current   Mammogram due December 2024  ROS:    General: negative for - chills, fever, weight changes or malaise  HEENT: no sore throat, nasal congestion, vision problems or ear problems  Respiratory: no cough, shortness of breath, or wheezing  Cardiovascular: no chest pain, palpitations, or dyspnea on exertion  Gastrointestinal: no abdominal pain, N/V, change in bowel habits  Musculoskeletal: no back pain or joint pain  Neurological: no headache or dizziness  Endo:  No polyuria or polydipsia  : no urinary  Skin: none   Psychological: negative for - anxiety, depression, sleeps issues    Prior to Admission medications    Medication Sig Start Date End Date Taking? Authorizing Provider   escitalopram (LEXAPRO) 5 MG tablet Take 1 tablet by mouth daily 9/3/24  Yes Patricia Iverson APRN - CNP   famotidine (PEPCID) 20 MG tablet Take 1 tablet by mouth 2 times daily as needed (acid reflux) 2/13/24  Yes

## 2024-10-15 ENCOUNTER — OFFICE VISIT (OUTPATIENT)
Facility: CLINIC | Age: 49
End: 2024-10-15
Payer: COMMERCIAL

## 2024-10-15 ENCOUNTER — HOSPITAL ENCOUNTER (OUTPATIENT)
Facility: HOSPITAL | Age: 49
Setting detail: SPECIMEN
Discharge: HOME | End: 2024-10-18

## 2024-10-15 VITALS
RESPIRATION RATE: 20 BRPM | TEMPERATURE: 97.7 F | HEIGHT: 65 IN | DIASTOLIC BLOOD PRESSURE: 78 MMHG | BODY MASS INDEX: 22.56 KG/M2 | WEIGHT: 135.4 LBS | SYSTOLIC BLOOD PRESSURE: 114 MMHG | HEART RATE: 63 BPM | OXYGEN SATURATION: 98 %

## 2024-10-15 DIAGNOSIS — Z13.0 SCREENING FOR DEFICIENCY ANEMIA: ICD-10-CM

## 2024-10-15 DIAGNOSIS — Z13.220 SCREENING FOR CHOLESTEROL LEVEL: ICD-10-CM

## 2024-10-15 DIAGNOSIS — Z13.228 SCREENING FOR METABOLIC DISORDER: ICD-10-CM

## 2024-10-15 DIAGNOSIS — Z13.89 SCREENING FOR BLOOD OR PROTEIN IN URINE: Primary | ICD-10-CM

## 2024-10-15 DIAGNOSIS — Z13.29 SCREENING FOR THYROID DISORDER: ICD-10-CM

## 2024-10-15 LAB — SENTARA SPECIMEN COLLECTION: NORMAL

## 2024-10-15 PROCEDURE — 99214 OFFICE O/P EST MOD 30 MIN: CPT | Performed by: NURSE PRACTITIONER

## 2024-10-15 PROCEDURE — 99001 SPECIMEN HANDLING PT-LAB: CPT

## 2024-10-15 NOTE — PROGRESS NOTES
Diana Morris is a 49 y.o. female  established patient, here for evaluation of the following chief complaint(s):  Chief Complaint   Patient presents with    1 Month Follow-Up    Discuss Medications     Cosamin DS    Other     Discuss Thyroid Disease      Assessment and Plan  1. Screening for blood or protein in urine  -     Urinalysis with Microscopic; Future  2. Screening for metabolic disorder  -     Comprehensive Metabolic Panel; Future  3. Screening for cholesterol level  -     Lipid Panel; Future  4. Screening for thyroid disorder  -     TSH + Free T4 Panel; Future  5. Screening for deficiency anemia  -     CBC with Auto Differential; Future       Return in about 1 month (around 11/15/2024) for Pap Smear, results, ACP, 30.   HPI:   In office visit.    Pt is taking glucosamine and chondroitin for general joint pain.    Pt doesn't like the Lexapro for anxiety. She took for 1 week and it made her tried. She declined another medication. She will just exercise.     Pt reports she had her thyroid checked in [place] and was told to watch her \"sweets.\"     ROS:    General: negative for - chills, fever, weight changes or malaise  HEENT: no sore throat, nasal congestion, vision problems or ear problems  Respiratory: no cough, shortness of breath, or wheezing  Cardiovascular: no chest pain, palpitations, or dyspnea on exertion  Gastrointestinal: no abdominal pain, N/V, change in bowel habits  Musculoskeletal: no back pain or joint pain  Neurological: no headache or dizziness  Endo:  No polyuria or polydipsia  : no urinary  Skin: none   Psychological: negative for - anxiety, depression, sleeps issues    Prior to Admission medications    Medication Sig Start Date End Date Taking? Authorizing Provider   famotidine (PEPCID) 20 MG tablet Take 1 tablet by mouth 2 times daily as needed (acid reflux) 2/13/24  Yes Patricia Iverson APRN - CNP   fluconazole (DIFLUCAN) 150 MG tablet Take 1 tablet by mouth every 72 hours 10/20/23  Yes

## 2024-10-15 NOTE — PROGRESS NOTES
Diana Morris is a 49 y.o. year old female who presents today for   Chief Complaint   Patient presents with    1 Month Follow-Up    Discuss Medications     Cosamin DS    Other     Discuss Thyroid Disease        \"Have you been to the ER, urgent care clinic since your last visit?  Hospitalized since your last visit?\"   NO     “Have you seen or consulted any other health care providers outside our system since your last visit?”   NO             Ysabel Mary  [facility]  Phone: [phone]  Fax: [phone]

## 2024-10-15 NOTE — PATIENT INSTRUCTIONS
[facility]  Address: [address] [place] [id number]  Phone: [phone]

## 2024-10-16 LAB
A/G RATIO: 1.5 RATIO (ref 1.1–2.6)
ALBUMIN: 4.4 G/DL (ref 3.5–5)
ALP BLD-CCNC: 87 U/L (ref 25–115)
ALT SERPL-CCNC: 10 U/L (ref 5–40)
ANION GAP SERPL CALCULATED.3IONS-SCNC: 11 MMOL/L (ref 3–15)
AST SERPL-CCNC: 15 U/L (ref 10–37)
BACTERIA: PRESENT
BASOPHILS ABSOLUTE: 0 K/UL (ref 0–0.2)
BASOPHILS RELATIVE PERCENT: 1 % (ref 0–2)
BILIRUB SERPL-MCNC: 0.5 MG/DL (ref 0.2–1.2)
BILIRUB SERPL-MCNC: NEGATIVE MG/DL
BLOOD: NEGATIVE
BUN BLDV-MCNC: 11 MG/DL (ref 6–22)
CALCIUM SERPL-MCNC: 9.9 MG/DL (ref 8.4–10.5)
CHLORIDE BLD-SCNC: 103 MMOL/L (ref 98–110)
CHOLESTEROL, TOTAL: 241 MG/DL (ref 110–200)
CHOLESTEROL/HDL RATIO: 3 (ref 0–5)
CLARITY, UA: CLEAR
CO2: 26 MMOL/L (ref 20–32)
COLOR, UA: YELLOW
CREAT SERPL-MCNC: 0.7 MG/DL (ref 0.5–1.2)
EOSINOPHIL # BLD: 1 % (ref 0–6)
EOSINOPHILS ABSOLUTE: 0.1 K/UL (ref 0–0.5)
EPITHELIAL CELLS: ABNORMAL /HPF
GFR, ESTIMATED: >60 ML/MIN/1.73 SQ.M.
GLOBULIN: 3 G/DL (ref 2–4)
GLUCOSE: 88 MG/DL (ref 70–99)
GLUCOSE: NEGATIVE MG/DL
HCT VFR BLD CALC: 41.6 % (ref 35.1–48)
HDLC SERPL-MCNC: 81 MG/DL
HEMOGLOBIN: 13.7 G/DL (ref 11.7–16)
HYALINE CASTS: ABNORMAL /LPF (ref 0–2)
KETONES, URINE: ABNORMAL MG/DL
LDL CHOLESTEROL: 143 MG/DL (ref 50–99)
LDL/HDL RATIO: 1.8
LEUKOCYTE ESTERASE, URINE: NEGATIVE
LYMPHOCYTES # BLD: 19 % (ref 20–45)
LYMPHOCYTES ABSOLUTE: 0.8 K/UL (ref 1–4.8)
MCH RBC QN AUTO: 30 PG (ref 26–34)
MCHC RBC AUTO-ENTMCNC: 33 G/DL (ref 31–36)
MCV RBC AUTO: 92 FL (ref 80–99)
MONOCYTES ABSOLUTE: 0.3 K/UL (ref 0.1–1)
MONOCYTES: 6 % (ref 3–12)
NEUTROPHILS ABSOLUTE: 3.2 K/UL (ref 1.8–7.7)
NEUTROPHILS: 73 % (ref 40–75)
NITRITE, URINE: NEGATIVE
NON-HDL CHOLESTEROL: 160 MG/DL
PDW BLD-RTO: 12.6 % (ref 10–15.5)
PH, URINE: 7 PH (ref 5–8)
PLATELET # BLD: 240 K/UL (ref 140–440)
PMV BLD AUTO: 9.9 FL (ref 9–13)
POTASSIUM SERPL-SCNC: 4.8 MMOL/L (ref 3.5–5.5)
PROTEIN UA: ABNORMAL MG/DL
RBC # BLD: 4.53 M/UL (ref 3.8–5.2)
RBC URINE: ABNORMAL /HPF
SODIUM BLD-SCNC: 140 MMOL/L (ref 133–145)
SPECIFIC GRAVITY UA: 1.02 (ref 1–1.03)
T4 FREE: 1.3 NG/DL (ref 0.9–1.8)
TOTAL PROTEIN: 7.4 G/DL (ref 6.4–8.3)
TRIGL SERPL-MCNC: 85 MG/DL (ref 40–149)
TSH SERPL DL<=0.05 MIU/L-ACNC: 2.4 MCU/ML (ref 0.27–4.2)
UROBILINOGEN, URINE: 0.2 MG/DL
VLDLC SERPL CALC-MCNC: 17 MG/DL (ref 8–30)
WBC # BLD: 4.3 K/UL (ref 4–11)
WBC UA: ABNORMAL /HPF (ref 0–5)

## 2024-11-19 ENCOUNTER — OFFICE VISIT (OUTPATIENT)
Facility: CLINIC | Age: 49
End: 2024-11-19

## 2024-11-19 VITALS
TEMPERATURE: 97.7 F | HEART RATE: 54 BPM | SYSTOLIC BLOOD PRESSURE: 110 MMHG | WEIGHT: 134 LBS | OXYGEN SATURATION: 98 % | DIASTOLIC BLOOD PRESSURE: 75 MMHG | BODY MASS INDEX: 22.33 KG/M2 | HEIGHT: 65 IN

## 2024-11-19 DIAGNOSIS — N95.1 MENOPAUSAL SYMPTOM: ICD-10-CM

## 2024-11-19 DIAGNOSIS — E78.00 ELEVATED LDL CHOLESTEROL LEVEL: ICD-10-CM

## 2024-11-19 DIAGNOSIS — Z71.3 NUTRITIONAL COUNSELING: ICD-10-CM

## 2024-11-19 DIAGNOSIS — Z81.8 FAMILY HISTORY OF DEMENTIA: ICD-10-CM

## 2024-11-19 DIAGNOSIS — Z71.89 ACP (ADVANCE CARE PLANNING): ICD-10-CM

## 2024-11-19 DIAGNOSIS — F41.9 ANXIETY: ICD-10-CM

## 2024-11-19 DIAGNOSIS — Z12.4 PAP SMEAR FOR CERVICAL CANCER SCREENING: Primary | ICD-10-CM

## 2024-11-19 RX ORDER — PRAVASTATIN SODIUM 20 MG
20 TABLET ORAL DAILY
Qty: 30 TABLET | Refills: 5 | Status: SHIPPED | OUTPATIENT
Start: 2024-11-19

## 2024-11-19 RX ORDER — MULTIVIT-MIN/IRON/FOLIC ACID/K 18-600-40
4000 CAPSULE ORAL DAILY
Qty: 180 CAPSULE | Refills: 3 | Status: SHIPPED | OUTPATIENT
Start: 2024-11-19

## 2024-11-19 NOTE — ACP (ADVANCE CARE PLANNING)
Advance Care Planning   General Advance Care Planning (ACP) Conversation    Date of Conversation: 11/19/2024  Conducted with: Patient with Decision Making Capacity  Other persons present: None    Healthcare Decision Maker:    Primary Decision Maker: Jose Haq - Spouse - [phone]    Secondary Decision Maker: Rand Liao - Child - [phone]    Today we documented Decision Maker(s) consistent with Legal Next of Kin hierarchy.  Content/Action Overview:  Has ACP document(s) NOT on file - requested patient to provide  Reviewed DNR/DNI and patient elects Full Code (Attempt Resuscitation)  treatment goals, artificial nutrition, ventilation preferences, and resuscitation preferences       Length of Voluntary ACP Conversation in minutes:  <16 minutes (Non-Billable)    ADE MELGAR, APRN - CNP

## 2024-11-19 NOTE — PROGRESS NOTES
Diana Morris is a 49 y.o. year old female who presents today for   Chief Complaint   Patient presents with    Gynecologic Exam        \"Have you been to the ER, urgent care clinic since your last visit?  Hospitalized since your last visit?\"   NO     “Have you seen or consulted any other health care providers outside our system since your last visit?”   NO             - IVY Viera  [facility]  Phone: [phone]  Fax: [phone]

## 2024-11-19 NOTE — PROGRESS NOTES
Diana Morris is a 49 y.o. female  established patient, here for evaluation of the following chief complaint(s):  Chief Complaint   Patient presents with    Gynecologic Exam      Assessment and Plan  1. Pap smear for cervical cancer screening  -     PAP IG, CT-NG-TV, Aptima HPV and rfx 16/18,45 (199315); Future  2. Anxiety  -     vitamin D 50 MCG (2000 UT) CAPS capsule; Take 2 capsules by mouth daily, Disp-180 capsule, R-3Normal  3. Elevated LDL cholesterol level  -     pravastatin (PRAVACHOL) 20 MG tablet; Take 1 tablet by mouth daily, Disp-30 tablet, R-5Normal  -     Lipid Panel; Future  4. ACP (advance care planning)  -     FULL CODE  5. Menopausal symptom  6. Family history of dementia  Comments:  pt worries  7. Nutritional counseling  Comments:  dementia prevention       Return in about 1 month (around 12/19/2024) for elevated LDL, get labs 1 week before next visit, 15.   HPI:   In office visit. Pt is well. Routine pap smear.    H/O elevated LDL. She rarely eats meat or seafood. She mostly has vegetables and one egg daily. Pt reports she exercise w/ running 10 minutes a day.    Pt is taking a Chinese herbal medication menopausal symptoms that is working. Pt has is sleeping better and has less anxiety on this herbal medication.      Pt worries dementia because it runs in her family.     ROS:    General: negative for - chills, fever, weight changes or malaise  HEENT: no sore throat, nasal congestion, vision problems or ear problems  Respiratory: no cough, shortness of breath, or wheezing  Cardiovascular: no chest pain, palpitations, or dyspnea on exertion  Gastrointestinal: no abdominal pain, N/V, change in bowel habits  Musculoskeletal: no back pain or joint pain  Neurological: no headache or dizziness  Endo:  No polyuria or polydipsia  : no urinary  Skin: none   Psychological: negative for - anxiety, depression, sleeps issues    Prior to Admission medications    Medication Sig Start Date End Date Taking?

## 2024-11-21 LAB
CHLAMYDIA TRACHOMATIS THINPREP: NEGATIVE
HPV DNA HIGH RISK: NEGATIVE
HPV, GENOTYPE 16: NEGATIVE
HPV, GENOTYPE 18: NEGATIVE
NEISSERIA GONORRHOEAE THINPREP: NEGATIVE
TRICHOMONAS NUC AMP-THIN PREP: NEGATIVE

## 2024-12-17 ENCOUNTER — HOSPITAL ENCOUNTER (OUTPATIENT)
Facility: HOSPITAL | Age: 49
Setting detail: SPECIMEN
Discharge: HOME | End: 2024-12-20

## 2024-12-17 LAB — SENTARA SPECIMEN COLLECTION: NORMAL

## 2024-12-17 PROCEDURE — 99001 SPECIMEN HANDLING PT-LAB: CPT

## 2024-12-18 LAB
CHOLESTEROL, TOTAL: 252 MG/DL (ref 110–200)
CHOLESTEROL/HDL RATIO: 2.9 (ref 0–5)
HDLC SERPL-MCNC: 86 MG/DL
LDL CHOLESTEROL: 140 MG/DL (ref 50–99)
LDL/HDL RATIO: 1.6
NON-HDL CHOLESTEROL: 166 MG/DL
TRIGL SERPL-MCNC: 126 MG/DL (ref 40–149)
VLDLC SERPL CALC-MCNC: 25 MG/DL (ref 8–30)

## 2025-01-03 ENCOUNTER — OFFICE VISIT (OUTPATIENT)
Facility: CLINIC | Age: 50
End: 2025-01-03
Payer: COMMERCIAL

## 2025-01-03 VITALS
DIASTOLIC BLOOD PRESSURE: 82 MMHG | SYSTOLIC BLOOD PRESSURE: 127 MMHG | TEMPERATURE: 98 F | WEIGHT: 135 LBS | HEART RATE: 60 BPM | RESPIRATION RATE: 12 BRPM | BODY MASS INDEX: 22.49 KG/M2 | HEIGHT: 65 IN | OXYGEN SATURATION: 97 %

## 2025-01-03 DIAGNOSIS — E78.00 ELEVATED LDL CHOLESTEROL LEVEL: Primary | ICD-10-CM

## 2025-01-03 DIAGNOSIS — K30 INDIGESTION: ICD-10-CM

## 2025-01-03 PROCEDURE — 99214 OFFICE O/P EST MOD 30 MIN: CPT | Performed by: NURSE PRACTITIONER

## 2025-01-03 RX ORDER — PRAVASTATIN SODIUM 20 MG
20 TABLET ORAL DAILY
Qty: 90 TABLET | Refills: 1 | Status: SHIPPED | OUTPATIENT
Start: 2025-01-03

## 2025-01-03 RX ORDER — FAMOTIDINE 20 MG/1
20 TABLET, FILM COATED ORAL 2 TIMES DAILY PRN
Qty: 60 TABLET | Refills: 5 | Status: SHIPPED | OUTPATIENT
Start: 2025-01-03

## 2025-01-03 SDOH — ECONOMIC STABILITY: FOOD INSECURITY: WITHIN THE PAST 12 MONTHS, YOU WORRIED THAT YOUR FOOD WOULD RUN OUT BEFORE YOU GOT MONEY TO BUY MORE.: NEVER TRUE

## 2025-01-03 SDOH — ECONOMIC STABILITY: FOOD INSECURITY: WITHIN THE PAST 12 MONTHS, THE FOOD YOU BOUGHT JUST DIDN'T LAST AND YOU DIDN'T HAVE MONEY TO GET MORE.: NEVER TRUE

## 2025-01-03 SDOH — ECONOMIC STABILITY: INCOME INSECURITY: HOW HARD IS IT FOR YOU TO PAY FOR THE VERY BASICS LIKE FOOD, HOUSING, MEDICAL CARE, AND HEATING?: NOT HARD AT ALL

## 2025-01-03 ASSESSMENT — PATIENT HEALTH QUESTIONNAIRE - PHQ9
SUM OF ALL RESPONSES TO PHQ QUESTIONS 1-9: 0
1. LITTLE INTEREST OR PLEASURE IN DOING THINGS: NOT AT ALL
2. FEELING DOWN, DEPRESSED OR HOPELESS: NOT AT ALL
SUM OF ALL RESPONSES TO PHQ9 QUESTIONS 1 & 2: 0
SUM OF ALL RESPONSES TO PHQ QUESTIONS 1-9: 0

## 2025-01-03 NOTE — PROGRESS NOTES
Diana Morris is a 49 y.o. female  established patient, here for evaluation of the following chief complaint(s):  Chief Complaint   Patient presents with    Follow-up      Assessment and Plan  1. Elevated LDL cholesterol level  -     pravastatin (PRAVACHOL) 20 MG tablet; Take 1 tablet by mouth daily, Disp-90 tablet, R-1Normal  -     Lipid Panel; Future  2. Indigestion  -     famotidine (PEPCID) 20 MG tablet; Take 1 tablet by mouth 2 times daily as needed (acid reflux), Disp-60 tablet, R-5Normal       Return in about 6 months (around 7/3/2025) for Routine, 15, get labs 1 week before next visit.   HPI:   In office visit.    Patient concerned over elevated cholesterol.  12/17/2024 total cholesterol 252 and LDL cholesterol 140.  On pravastatin 20 mg daily.    Pt has cut back on meat intake. Pt is only taking Pravastatin \"sometimes\" because she is \"exercising.\" Advised to take pravastatin at night. Can take on an empty.   ROS:    General: negative for - chills, fever, weight changes or malaise  HEENT: no sore throat, nasal congestion, vision problems or ear problems  Respiratory: no cough, shortness of breath, or wheezing  Cardiovascular: no chest pain, palpitations, or dyspnea on exertion  Gastrointestinal: no abdominal pain, N/V, change in bowel habits  Musculoskeletal: no back pain or joint pain  Neurological: no headache or dizziness  Endo:  No polyuria or polydipsia  : no urinary  Skin: none   Psychological: negative for - anxiety, depression, sleeps issues    Prior to Admission medications    Medication Sig Start Date End Date Taking? Authorizing Provider   famotidine (PEPCID) 20 MG tablet Take 1 tablet by mouth 2 times daily as needed (acid reflux) 1/3/25  Yes Patricia Iverson APRN - CNP   pravastatin (PRAVACHOL) 20 MG tablet Take 1 tablet by mouth daily 1/3/25  Yes Patricia Iverson APRN - CNP   vitamin D 50 MCG (2000 UT) CAPS capsule Take 2 capsules by mouth daily 11/19/24  Yes Patricia Iverson APRN - CNP

## 2025-07-11 ENCOUNTER — LAB (OUTPATIENT)
Facility: CLINIC | Age: 50
End: 2025-07-11

## 2025-07-11 ENCOUNTER — HOSPITAL ENCOUNTER (OUTPATIENT)
Facility: HOSPITAL | Age: 50
Setting detail: SPECIMEN
Discharge: HOME | End: 2025-07-14

## 2025-07-11 DIAGNOSIS — E78.00 ELEVATED LDL CHOLESTEROL LEVEL: ICD-10-CM

## 2025-07-11 LAB
CHOLESTEROL, TOTAL: 228 MG/DL (ref 110–200)
CHOLESTEROL/HDL RATIO: 3.1 (ref 0–5)
HDLC SERPL-MCNC: 73 MG/DL
LDL CHOLESTEROL: 127 MG/DL (ref 50–99)
LDL/HDL RATIO: 1.8
NON-HDL CHOLESTEROL: 155 MG/DL
TRIGL SERPL-MCNC: 136 MG/DL (ref 40–149)
VLDLC SERPL CALC-MCNC: 27 MG/DL (ref 8–30)

## 2025-07-11 PROCEDURE — 99001 SPECIMEN HANDLING PT-LAB: CPT

## 2025-07-13 LAB — SENTARA SPECIMEN COLLECTION: NORMAL

## 2025-07-30 ENCOUNTER — OFFICE VISIT (OUTPATIENT)
Facility: CLINIC | Age: 50
End: 2025-07-30
Payer: COMMERCIAL

## 2025-07-30 VITALS
HEIGHT: 65 IN | DIASTOLIC BLOOD PRESSURE: 80 MMHG | HEART RATE: 54 BPM | BODY MASS INDEX: 22.82 KG/M2 | OXYGEN SATURATION: 98 % | SYSTOLIC BLOOD PRESSURE: 124 MMHG | RESPIRATION RATE: 16 BRPM | WEIGHT: 137 LBS | TEMPERATURE: 97.3 F

## 2025-07-30 DIAGNOSIS — Z00.00 ANNUAL PHYSICAL EXAM: Primary | ICD-10-CM

## 2025-07-30 DIAGNOSIS — N95.0 POSTMENOPAUSAL BLEEDING: ICD-10-CM

## 2025-07-30 DIAGNOSIS — K30 INDIGESTION: ICD-10-CM

## 2025-07-30 DIAGNOSIS — Z12.31 ENCOUNTER FOR SCREENING MAMMOGRAM FOR MALIGNANT NEOPLASM OF BREAST: ICD-10-CM

## 2025-07-30 DIAGNOSIS — R73.03 PREDIABETES: ICD-10-CM

## 2025-07-30 DIAGNOSIS — Z11.59 NEED FOR HEPATITIS C SCREENING TEST: ICD-10-CM

## 2025-07-30 DIAGNOSIS — Z23 IMMUNIZATION DUE: ICD-10-CM

## 2025-07-30 DIAGNOSIS — E78.00 ELEVATED LDL CHOLESTEROL LEVEL: ICD-10-CM

## 2025-07-30 LAB
ESTIMATED AVERAGE GLUCOSE: 110 MG/DL (ref 91–123)
HBA1C MFR BLD: 5.5 % (ref 4.8–5.6)

## 2025-07-30 PROCEDURE — 99396 PREV VISIT EST AGE 40-64: CPT | Performed by: NURSE PRACTITIONER

## 2025-07-30 PROCEDURE — 90677 PCV20 VACCINE IM: CPT | Performed by: NURSE PRACTITIONER

## 2025-07-30 PROCEDURE — 90471 IMMUNIZATION ADMIN: CPT | Performed by: NURSE PRACTITIONER

## 2025-07-30 RX ORDER — FAMOTIDINE 20 MG/1
20 TABLET, FILM COATED ORAL 2 TIMES DAILY PRN
Qty: 60 TABLET | Refills: 5 | Status: SHIPPED | OUTPATIENT
Start: 2025-07-30

## 2025-07-30 RX ORDER — PRAVASTATIN SODIUM 20 MG
20 TABLET ORAL DAILY
Qty: 90 TABLET | Refills: 1 | Status: SHIPPED | OUTPATIENT
Start: 2025-07-30

## 2025-07-30 SDOH — ECONOMIC STABILITY: FOOD INSECURITY: WITHIN THE PAST 12 MONTHS, THE FOOD YOU BOUGHT JUST DIDN'T LAST AND YOU DIDN'T HAVE MONEY TO GET MORE.: NEVER TRUE

## 2025-07-30 SDOH — ECONOMIC STABILITY: FOOD INSECURITY: WITHIN THE PAST 12 MONTHS, YOU WORRIED THAT YOUR FOOD WOULD RUN OUT BEFORE YOU GOT MONEY TO BUY MORE.: NEVER TRUE

## 2025-07-30 NOTE — PROGRESS NOTES
Diana Morris is a 49 y.o. female  established patient, here for evaluation of the following chief complaint(s):  Chief Complaint   Patient presents with    Follow-up     6 months       Assessment and Plan  1. Annual physical exam  2. Elevated LDL cholesterol level  -     pravastatin (PRAVACHOL) 20 MG tablet; Take 1 tablet by mouth daily, Disp-90 tablet, R-1Normal  3. Prediabetes  -     Hemoglobin A1C; Future  4. Indigestion  -     famotidine (PEPCID) 20 MG tablet; Take 1 tablet by mouth 2 times daily as needed (acid reflux), Disp-60 tablet, R-5Normal  5. Postmenopausal bleeding  -     External Referral To Ob-Gyn  6. Immunization due  -     Pneumococcal, PCV20, PREVNAR 20, (age 6w+), IM, PF  7. Need for hepatitis C screening test  -     HCV RNA by [facility] Ql,Rflx TO Qt; Future  8. Encounter for screening mammogram for malignant neoplasm of breast  -     [facility] DIGITAL SCREEN W OR WO CAD BILATERAL; Future     Return in about 3 months (around 10/30/2025) for Routine, 15.   HPI:   In office visit for annual exam with her .  Virtual [employer]  present.    It has been over a year since having a period but recently had a short period. She is feeling well. 11/19/2024 Negative for Intraepithelial Lesion or Malignancy and HPV.  Referral to GYN.    Elevated cholesterol  Elevated LDL on pravastatin  20 mg daily but mostly doesn't take it.  She does not always eat dinner and does not want to take it on an empty stomach.  She will take the pravastatin with another meal.  Discussed choosing foods with unsaturated fats like olive oil, nuts, seeds and avocados.  Limit foods with saturated fat like processed foods and meats, coconut oil cream and butter.  Eat high-fiber foods like whole grains, beans and vegetables each day.  Eat foods with soluble fiber like foods, psyllium and/or barley.  Choose foods with little or no sugar added.  Read labels.     History of prediabetes  A1c today    Discussed screening for hep

## 2025-07-30 NOTE — PATIENT INSTRUCTIONS
[facility]  phone: [phone]      [facility] Dec 2025  [place]  Address: [address] [address]  Phone: [phone]

## 2025-07-31 ENCOUNTER — HOSPITAL ENCOUNTER (OUTPATIENT)
Facility: HOSPITAL | Age: 50
Setting detail: SPECIMEN
Discharge: HOME | End: 2025-08-03

## 2025-07-31 LAB — SENTARA SPECIMEN COLLECTION: NORMAL

## 2025-07-31 PROCEDURE — 99001 SPECIMEN HANDLING PT-LAB: CPT

## 2025-08-01 LAB — HEPATITIS C VIRUS RNA PCR TND: NORMAL IU/ML

## 2025-08-26 ENCOUNTER — HOSPITAL ENCOUNTER (OUTPATIENT)
Dept: WOMENS IMAGING | Facility: HOSPITAL | Age: 50
Discharge: HOME | End: 2025-08-29

## 2025-08-26 DIAGNOSIS — Z12.31 ENCOUNTER FOR SCREENING MAMMOGRAM FOR MALIGNANT NEOPLASM OF BREAST: ICD-10-CM
